# Patient Record
Sex: MALE | Race: WHITE | Employment: FULL TIME | URBAN - METROPOLITAN AREA
[De-identification: names, ages, dates, MRNs, and addresses within clinical notes are randomized per-mention and may not be internally consistent; named-entity substitution may affect disease eponyms.]

---

## 2023-06-23 PROBLEM — E78.5 HYPERLIPIDEMIA: Status: ACTIVE | Noted: 2023-06-23

## 2023-06-27 ENCOUNTER — OFFICE VISIT (OUTPATIENT)
Dept: PRIMARY CARE | Facility: CLINIC | Age: 38
End: 2023-06-27
Payer: COMMERCIAL

## 2023-06-27 VITALS
SYSTOLIC BLOOD PRESSURE: 138 MMHG | WEIGHT: 233.5 LBS | BODY MASS INDEX: 30.95 KG/M2 | OXYGEN SATURATION: 98 % | HEART RATE: 60 BPM | HEIGHT: 73 IN | DIASTOLIC BLOOD PRESSURE: 81 MMHG

## 2023-06-27 DIAGNOSIS — Z00.00 WELL ADULT HEALTH CHECK: Primary | ICD-10-CM

## 2023-06-27 DIAGNOSIS — R21 RASH: ICD-10-CM

## 2023-06-27 DIAGNOSIS — E78.5 HYPERLIPIDEMIA, UNSPECIFIED HYPERLIPIDEMIA TYPE: ICD-10-CM

## 2023-06-27 PROCEDURE — 99214 OFFICE O/P EST MOD 30 MIN: CPT | Performed by: FAMILY MEDICINE

## 2023-06-27 PROCEDURE — 99395 PREV VISIT EST AGE 18-39: CPT | Performed by: FAMILY MEDICINE

## 2023-06-27 RX ORDER — TRIAMCINOLONE ACETONIDE 1 MG/G
CREAM TOPICAL 2 TIMES DAILY
Qty: 15 G | Refills: 0 | Status: SHIPPED | OUTPATIENT
Start: 2023-06-27 | End: 2024-05-31 | Stop reason: WASHOUT

## 2023-06-27 ASSESSMENT — ENCOUNTER SYMPTOMS
BLOOD IN STOOL: 1
TROUBLE SWALLOWING: 0
COUGH: 0
SHORTNESS OF BREATH: 0
CHILLS: 0
WHEEZING: 0
WEAKNESS: 0
VOMITING: 0
LIGHT-HEADEDNESS: 0
FEVER: 0
DIZZINESS: 0
DYSURIA: 0
CONFUSION: 0
ABDOMINAL PAIN: 0
UNEXPECTED WEIGHT CHANGE: 0
DIARRHEA: 0
DIFFICULTY URINATING: 0
NUMBNESS: 0
NAUSEA: 0

## 2023-06-27 ASSESSMENT — PROMIS GLOBAL HEALTH SCALE
RATE_GENERAL_HEALTH: VERY GOOD
EMOTIONAL_PROBLEMS: RARELY
RATE_SOCIAL_SATISFACTION: VERY GOOD
RATE_AVERAGE_FATIGUE: MILD
CARRYOUT_PHYSICAL_ACTIVITIES: COMPLETELY
RATE_PHYSICAL_HEALTH: VERY GOOD
CARRYOUT_SOCIAL_ACTIVITIES: VERY GOOD
RATE_AVERAGE_PAIN: 2
RATE_QUALITY_OF_LIFE: VERY GOOD
RATE_MENTAL_HEALTH: VERY GOOD

## 2023-06-27 NOTE — ASSESSMENT & PLAN NOTE
Likely contact dermatitis (poison ivy) v. eczema +/- post-inflammatory hyperpigmentation. Try a topical steroid for up to 2 weeks. Dermatology if not resolved or returns.

## 2023-06-27 NOTE — PROGRESS NOTES
"Subjective   Patient ID: Mateo Leong is a 37 y.o. male who presents for Annual Exam (Pt presents for wellness exam, no concerns no rx's.BL).    HPI   Generally feeling well.    Has had blood in bowel movements. 10y ago had a colonoscopy for this. Reports internal hemorrhoids. Has always had little bit of bleeding off and on. No change since before the colonoscopy. Denies polyps. Does not recall a recommendation to repeat a colonoscopy. Denies FMHx colon cancer. FMHx father Crohn disease. He has occasional constipation.     Late March 2023 had rash just like poison ivy. Got better, but never really healed (persistent discoloration). Patch on ventral right wrist has recurred. Itchy, red. No pain. Current rash started \"inflating\" yesterday. Tried poison ivy cream      Review of Systems   Constitutional:  Negative for chills, fever and unexpected weight change.   HENT:  Negative for ear pain and trouble swallowing.    Respiratory:  Negative for cough, shortness of breath and wheezing.    Cardiovascular:  Negative for chest pain.   Gastrointestinal:  Positive for blood in stool. Negative for abdominal pain, diarrhea, nausea and vomiting.   Genitourinary:  Negative for difficulty urinating and dysuria.   Skin:  Negative for rash.   Neurological:  Negative for dizziness, syncope, weakness, light-headedness and numbness.   Psychiatric/Behavioral:  Negative for behavioral problems and confusion.        Objective   /81   Pulse 60   Ht 1.854 m (6' 1\")   Wt 106 kg (233 lb 8 oz)   SpO2 98%   BMI 30.81 kg/m²     Physical Exam  Vitals and nursing note reviewed.   Constitutional:       General: He is not in acute distress.     Appearance: Normal appearance. He is well-developed.   HENT:      Head: Normocephalic and atraumatic.      Nose: Nose normal.   Eyes:      General: No scleral icterus.     Extraocular Movements: Extraocular movements intact.      Conjunctiva/sclera: Conjunctivae normal.   Neck:      Thyroid: No " thyromegaly.      Vascular: No carotid bruit or JVD.   Cardiovascular:      Rate and Rhythm: Normal rate and regular rhythm.      Heart sounds: Normal heart sounds.   Pulmonary:      Effort: Pulmonary effort is normal. No respiratory distress.      Breath sounds: Normal breath sounds.   Abdominal:      General: Bowel sounds are normal. There is no distension.      Palpations: Abdomen is soft. There is no mass.      Tenderness: There is no abdominal tenderness. There is no guarding or rebound.   Musculoskeletal:      Cervical back: Normal range of motion. No tenderness.      Right lower leg: No edema.      Left lower leg: No edema.   Skin:     General: Skin is warm and dry.      Coloration: Skin is not jaundiced.      Findings: Rash (45 x 30 mm erythematous rash of papules of varying shapes and sizes on the ventral right wrist) present.   Neurological:      General: No focal deficit present.      Mental Status: He is alert and oriented to person, place, and time. Mental status is at baseline.   Psychiatric:         Mood and Affect: Mood normal.         Thought Content: Thought content normal.         Assessment/Plan   Problem List Items Addressed This Visit       Hyperlipidemia     TLC, Metamucil, recheck.         Relevant Orders    TSH with reflex to Free T4 if abnormal    Well adult health check - Primary     37yM doing fairly well.         Relevant Orders    Lipid Panel    Comprehensive Metabolic Panel    Rash     Likely contact dermatitis (poison ivy) v. eczema +/- post-inflammatory hyperpigmentation. Try a topical steroid for up to 2 weeks. Dermatology if not resolved or returns.         Relevant Medications    triamcinolone (Kenalog) 0.1 % cream    Other Relevant Orders    Referral to Dermatology

## 2023-06-27 NOTE — PATIENT INSTRUCTIONS
Could try Metamucil/psyllium husk once or twice a day with plenty of water, both to prevent constipation, and for your cardiovascular health.    You may try a topical steroid for up to 2 weeks. Please follow-up with a dermatologist if symptoms persist, change, worsen, or return.    Recommend a low-sugar, low-fat, low-cholesterol, high-fiber, heart-healthy diet and lifestyle, and regular cardio exercise and weight loss as appropriate. Please return for a follow-up appointment in 6 months, earlier if any question or concern.    [facility]  Dr. Moris Francisco NP-C  Pamela Newsome PA-C  Gricelda Pettit FNP-C  [address]  [place], [place] [id number] [phone]    Dr. Agata Juan PA-C  Isamar Willams NP-C  [address] [place], [place]  [phone]    [facility]  [address], [address]  [place], [place] [id number] [phone]    [address] [facility] [address] [address], [address]  [place] [address]  [phone]    [address] [place] [address], [address]  [place], [place] [id number] [phone]     For assistance with scheduling referrals or consultations, please call [phone]. For laboratory, radiology, and other tests, please call [phone] ([phone] for pediatrics). Please review prescription inserts and published information for possible adverse effects. Return after testing or consultation to review results and recommendations, if symptoms persist, change, worsen, or return, or if you have any question or concern. For non-emergencies, you may call the office. For emergencies, call [phone] or go to the nearest Emergency Department. Please schedule additional appointment(s) to address concern(s) not addressed today.    In general, results will not be released or discussed over the telephone. Results of tests done through [facility] are released via  [employer]:  [email or web address]    Until we complete our transition to the new system,  additional information can be found at [email or web address] or on your Android or iOS (iPhone, iPad) device using the [employer] umer available free of charge in your device's umer store.

## 2023-07-13 ENCOUNTER — LAB (OUTPATIENT)
Dept: LAB | Facility: LAB | Age: 38
End: 2023-07-13
Payer: COMMERCIAL

## 2023-07-13 DIAGNOSIS — E78.5 HYPERLIPIDEMIA, UNSPECIFIED HYPERLIPIDEMIA TYPE: ICD-10-CM

## 2023-07-13 DIAGNOSIS — Z00.00 WELL ADULT HEALTH CHECK: ICD-10-CM

## 2023-07-13 LAB
ALANINE AMINOTRANSFERASE (SGPT) (U/L) IN SER/PLAS: 19 U/L (ref 10–52)
ALBUMIN (G/DL) IN SER/PLAS: 4.4 G/DL (ref 3.4–5)
ALKALINE PHOSPHATASE (U/L) IN SER/PLAS: 39 U/L (ref 33–120)
ANION GAP IN SER/PLAS: 13 MMOL/L (ref 10–20)
ASPARTATE AMINOTRANSFERASE (SGOT) (U/L) IN SER/PLAS: 21 U/L (ref 9–39)
BILIRUBIN TOTAL (MG/DL) IN SER/PLAS: 1.5 MG/DL (ref 0–1.2)
CALCIUM (MG/DL) IN SER/PLAS: 9.2 MG/DL (ref 8.6–10.3)
CARBON DIOXIDE, TOTAL (MMOL/L) IN SER/PLAS: 27 MMOL/L (ref 21–32)
CHLORIDE (MMOL/L) IN SER/PLAS: 103 MMOL/L (ref 98–107)
CHOLESTEROL (MG/DL) IN SER/PLAS: 224 MG/DL (ref 0–199)
CHOLESTEROL IN HDL (MG/DL) IN SER/PLAS: 48.7 MG/DL
CHOLESTEROL/HDL RATIO: 4.6
CREATININE (MG/DL) IN SER/PLAS: 0.96 MG/DL (ref 0.5–1.3)
GFR MALE: >90 ML/MIN/1.73M2
GLUCOSE (MG/DL) IN SER/PLAS: 86 MG/DL (ref 74–99)
LDL: 159 MG/DL (ref 0–99)
POTASSIUM (MMOL/L) IN SER/PLAS: 4.1 MMOL/L (ref 3.5–5.3)
PROTEIN TOTAL: 7.1 G/DL (ref 6.4–8.2)
SODIUM (MMOL/L) IN SER/PLAS: 139 MMOL/L (ref 136–145)
THYROTROPIN (MIU/L) IN SER/PLAS BY DETECTION LIMIT <= 0.05 MIU/L: 2.42 MIU/L (ref 0.44–3.98)
TRIGLYCERIDE (MG/DL) IN SER/PLAS: 80 MG/DL (ref 0–149)
UREA NITROGEN (MG/DL) IN SER/PLAS: 11 MG/DL (ref 6–23)
VLDL: 16 MG/DL (ref 0–40)

## 2023-07-13 PROCEDURE — 80053 COMPREHEN METABOLIC PANEL: CPT

## 2023-07-13 PROCEDURE — 84443 ASSAY THYROID STIM HORMONE: CPT

## 2023-07-13 PROCEDURE — 80061 LIPID PANEL: CPT

## 2023-07-13 PROCEDURE — 36415 COLL VENOUS BLD VENIPUNCTURE: CPT

## 2024-05-31 ENCOUNTER — LAB (OUTPATIENT)
Dept: LAB | Facility: LAB | Age: 39
End: 2024-05-31
Payer: COMMERCIAL

## 2024-05-31 ENCOUNTER — OFFICE VISIT (OUTPATIENT)
Dept: PRIMARY CARE | Facility: CLINIC | Age: 39
End: 2024-05-31
Payer: COMMERCIAL

## 2024-05-31 VITALS
WEIGHT: 246.5 LBS | HEART RATE: 63 BPM | SYSTOLIC BLOOD PRESSURE: 128 MMHG | HEIGHT: 74 IN | OXYGEN SATURATION: 99 % | DIASTOLIC BLOOD PRESSURE: 83 MMHG | TEMPERATURE: 98 F | BODY MASS INDEX: 31.63 KG/M2

## 2024-05-31 DIAGNOSIS — E80.6 HYPERBILIRUBINEMIA: ICD-10-CM

## 2024-05-31 DIAGNOSIS — Z00.00 WELL ADULT HEALTH CHECK: Primary | ICD-10-CM

## 2024-05-31 DIAGNOSIS — Z31.69 INFERTILITY COUNSELING: ICD-10-CM

## 2024-05-31 DIAGNOSIS — E78.5 HYPERLIPIDEMIA, UNSPECIFIED HYPERLIPIDEMIA TYPE: ICD-10-CM

## 2024-05-31 LAB
ALBUMIN SERPL BCP-MCNC: 4.6 G/DL (ref 3.4–5)
ALP SERPL-CCNC: 44 U/L (ref 33–120)
ALT SERPL W P-5'-P-CCNC: 26 U/L (ref 10–52)
AST SERPL W P-5'-P-CCNC: 22 U/L (ref 9–39)
BILIRUB DIRECT SERPL-MCNC: 0.2 MG/DL (ref 0–0.3)
BILIRUB SERPL-MCNC: 1.2 MG/DL (ref 0–1.2)
CHOLEST SERPL-MCNC: 208 MG/DL (ref 0–199)
CHOLESTEROL/HDL RATIO: 4.5
HDLC SERPL-MCNC: 46.1 MG/DL
LDLC SERPL CALC-MCNC: 141 MG/DL
NON HDL CHOLESTEROL: 162 MG/DL (ref 0–149)
PROT SERPL-MCNC: 7.4 G/DL (ref 6.4–8.2)
TRIGL SERPL-MCNC: 104 MG/DL (ref 0–149)
VLDL: 21 MG/DL (ref 0–40)

## 2024-05-31 PROCEDURE — 36415 COLL VENOUS BLD VENIPUNCTURE: CPT

## 2024-05-31 PROCEDURE — 80061 LIPID PANEL: CPT

## 2024-05-31 PROCEDURE — 80076 HEPATIC FUNCTION PANEL: CPT

## 2024-05-31 PROCEDURE — 99395 PREV VISIT EST AGE 18-39: CPT | Performed by: FAMILY MEDICINE

## 2024-05-31 PROCEDURE — 99214 OFFICE O/P EST MOD 30 MIN: CPT | Performed by: FAMILY MEDICINE

## 2024-05-31 ASSESSMENT — PATIENT HEALTH QUESTIONNAIRE - PHQ9
1. LITTLE INTEREST OR PLEASURE IN DOING THINGS: NOT AT ALL
2. FEELING DOWN, DEPRESSED OR HOPELESS: NOT AT ALL
SUM OF ALL RESPONSES TO PHQ9 QUESTIONS 1 AND 2: 0

## 2024-05-31 ASSESSMENT — ENCOUNTER SYMPTOMS
LOSS OF SENSATION IN FEET: 0
CONSTIPATION: 1
OCCASIONAL FEELINGS OF UNSTEADINESS: 0
BLOOD IN STOOL: 1

## 2024-05-31 NOTE — PROGRESS NOTES
"Subjective   Patient ID: Mateo Leong is a 38 y.o. male who presents for Annual Exam (Pt presents for wellness, c/o spouse not being pregnant trying for 7 months, no other concerns, no meds. BL).  HPI Historian(s): Self    Generally feeling well.     Trying to get pregnant since November. Wife is 37.    Reports h/o colonoscopy >15y ago for bloody stool, reports determined to be due to internal hemorrhoids. No different than it's ever been. Hasn't had a significant episode of bleeding in a long time. Reports constipation due to \"narrow areas of the colon,\" is better now.    Review of Systems   Gastrointestinal:  Positive for blood in stool and constipation.   All other systems reviewed and are negative.  Broken bones    Objective   /83   Pulse 63   Temp 36.7 °C (98 °F)   Ht 1.88 m (6' 2\")   Wt 112 kg (246 lb 8 oz)   SpO2 99%   BMI 31.65 kg/m²     Physical Exam  Vitals and nursing note reviewed.   Constitutional:       General: He is not in acute distress.     Appearance: Normal appearance. He is well-developed.      Comments: No assistive device presently being used.   HENT:      Head: Normocephalic and atraumatic.      Nose: Nose normal.   Eyes:      General: No scleral icterus.     Extraocular Movements: Extraocular movements intact.      Conjunctiva/sclera: Conjunctivae normal.   Neck:      Thyroid: No thyromegaly.      Vascular: No carotid bruit or JVD.   Cardiovascular:      Rate and Rhythm: Normal rate and regular rhythm.      Heart sounds: Normal heart sounds.   Pulmonary:      Effort: Pulmonary effort is normal. No respiratory distress.      Breath sounds: Normal breath sounds.   Abdominal:      General: Bowel sounds are normal. There is no distension.      Palpations: Abdomen is soft. There is no mass.      Tenderness: There is no abdominal tenderness. There is no guarding or rebound.      Hernia: There is no hernia in the left inguinal area or right inguinal area.   Genitourinary:     Penis: " Normal and circumcised.       Testes: Normal.      Epididymis:      Right: Normal.      Left: Normal.      Morgan stage (genital): 5.   Musculoskeletal:      Cervical back: Normal range of motion. No tenderness.      Right lower leg: No edema.      Left lower leg: No edema.   Skin:     General: Skin is warm and dry.      Coloration: Skin is not jaundiced.   Neurological:      General: No focal deficit present.      Mental Status: He is alert and oriented to person, place, and time. Mental status is at baseline.   Psychiatric:         Mood and Affect: Mood normal.         Behavior: Behavior normal.         Thought Content: Thought content normal.         Assessment/Plan   Problem List Items Addressed This Visit       Hyperlipidemia    Current Assessment & Plan     Therapeutic lifestyle changes. Metamucil.         Relevant Orders    Lipid Panel    Hepatic Function Panel    Well adult health check - Primary    Current Assessment & Plan     38yM doing fairly well.         Infertility counseling    Current Assessment & Plan     >36, no pregnancy after >6 months trying. Advised that his wife needs to address with her physician. Will let us know if he needs an order for semen analysis. Advised follow-up appointment 2w after submitting for such a test.         Hyperbilirubinemia

## 2024-05-31 NOTE — ASSESSMENT & PLAN NOTE
>36, no pregnancy after >6 months trying. Advised that his wife needs to address with her physician. Will let us know if he needs an order for semen analysis. Advised follow-up appointment 2w after submitting for such a test.

## 2024-06-13 ENCOUNTER — APPOINTMENT (OUTPATIENT)
Dept: PRIMARY CARE | Facility: CLINIC | Age: 39
End: 2024-06-13
Payer: COMMERCIAL

## 2024-06-18 ENCOUNTER — TELEPHONE (OUTPATIENT)
Dept: PRIMARY CARE | Facility: CLINIC | Age: 39
End: 2024-06-18
Payer: COMMERCIAL

## 2024-06-18 NOTE — TELEPHONE ENCOUNTER
PT is asking if Meka could put in an order for semen analysis. (Per last office note.) please advise pt when added. [phone]

## 2024-06-19 DIAGNOSIS — Z31.69 INFERTILITY COUNSELING: ICD-10-CM

## 2024-06-27 DIAGNOSIS — N46.9 INFERTILITY MALE: Primary | ICD-10-CM

## 2024-07-17 ENCOUNTER — APPOINTMENT (OUTPATIENT)
Dept: ENDOCRINOLOGY | Facility: CLINIC | Age: 39
End: 2024-07-17
Payer: COMMERCIAL

## 2024-07-18 ENCOUNTER — ANCILLARY PROCEDURE (OUTPATIENT)
Dept: ENDOCRINOLOGY | Facility: CLINIC | Age: 39
End: 2024-07-18
Payer: COMMERCIAL

## 2024-07-18 DIAGNOSIS — N46.9 INFERTILITY MALE: ICD-10-CM

## 2024-07-18 LAB
ABSTINENCE (DAYS): 5 DAYS (ref 2–7)
AGGLUTINATION (SEMEN): NO
ANALYZED TIME:: ABNORMAL
ANDROLOGY LAB ID#: ABNORMAL
CLUMPS (SEMEN): NO
COLLECTED COMPLETELY: YES
COLLECTION LOCATION:: ABNORMAL
COLLECTION METHOD:: ABNORMAL
CONCENTRATION CN (POST-WASH): 0 MILL/ML
CONCENTRATION(SEMEN): 0 MILL/ML (ref 15–?)
DEBRIS (SEMEN): YES
LEUKOCYTE (SEMEN): ABNORMAL
NON PROG. MOTILITY (SEMEN): 0 %
NON PROG. MOTILITY CN (POST-WASH): 0 %
PROG. MOTILITY (SEMEN): 0 % (ref 32–?)
PROG. MOTILITY CN (POST-WASH): 0 %
RECEIVED TIME:: ABNORMAL
REI PARTNER DOB: ABNORMAL
REI PARTNER NAME: ABNORMAL
SEMEN APPEARANCE: NORMAL
SEMEN LIQUEFACTION: NORMAL
SEMEN VISCOSITY: NORMAL
TOTAL MOTILITY (SEMEN): 0 % (ref 40–?)
TOTAL MOTILITY CN (POST-WASH): 0 %
TOTAL NO OF MOTILE (SEMEN): 0 MILL
TOTAL NO OF MOTILE CN (POST-WASH): 0 MILL
TOTAL NO OF RND CELLS (SEMEN): 0.5 MILL (ref ?–5)
TOTAL NO OF SPERM (SEMEN): 0 MILL (ref 39–?)
TOTAL NO OF SPERM CN (POST-WASH): 0 MILL
VOLUME (SEMEN): 5.1 ML (ref 1.5–?)
VOLUME CN (POST-WASH): 0.2 ML

## 2024-07-18 PROCEDURE — 89321 SEMEN ANAL SPERM DETECTION: CPT | Performed by: OBSTETRICS & GYNECOLOGY

## 2024-08-29 ENCOUNTER — APPOINTMENT (OUTPATIENT)
Dept: UROLOGY | Facility: CLINIC | Age: 39
End: 2024-08-29
Payer: COMMERCIAL

## 2024-08-29 VITALS — HEIGHT: 74 IN | BODY MASS INDEX: 31.57 KG/M2 | WEIGHT: 246 LBS

## 2024-08-29 DIAGNOSIS — R39.9 URINARY SYMPTOM OR SIGN: ICD-10-CM

## 2024-08-29 DIAGNOSIS — N46.9 MALE INFERTILITY, UNSPECIFIED: ICD-10-CM

## 2024-08-29 LAB
POC APPEARANCE, URINE: CLEAR
POC BILIRUBIN, URINE: NEGATIVE
POC BLOOD, URINE: NEGATIVE
POC COLOR, URINE: YELLOW
POC GLUCOSE, URINE: NEGATIVE MG/DL
POC KETONES, URINE: ABNORMAL MG/DL
POC LEUKOCYTES, URINE: NEGATIVE
POC NITRITE,URINE: NEGATIVE
POC PH, URINE: 6.5 PH
POC PROTEIN, URINE: NEGATIVE MG/DL
POC SPECIFIC GRAVITY, URINE: 1.02
POC UROBILINOGEN, URINE: 0.2 EU/DL

## 2024-08-29 PROCEDURE — 99204 OFFICE O/P NEW MOD 45 MIN: CPT | Performed by: UROLOGY

## 2024-08-29 PROCEDURE — 81003 URINALYSIS AUTO W/O SCOPE: CPT | Performed by: UROLOGY

## 2024-08-29 PROCEDURE — 3008F BODY MASS INDEX DOCD: CPT | Performed by: UROLOGY

## 2024-08-29 ASSESSMENT — PAIN SCALES - GENERAL: PAINLEVEL: 0-NO PAIN

## 2024-08-30 NOTE — PROGRESS NOTES
"  Patient is a 38 y.o. male presenting with infertility    SUBJECTIVE:  HPI   He has azoospermia.  He and his wife have been unable to conceive.  His wife has had a negative evaluation.  He denies ED.  He denies hernia repair.         No results found for: \"URINECULTURE\"     Past Medical History:   Diagnosis Date    Other acne 12/02/2004    Other hemorrhoids     Internal hemorrhoids    Personal history of other diseases of the digestive system     History of bloody stools    Right-sided low back pain without sciatica 01/18/2016      Past Surgical History:   Procedure Laterality Date    OTHER SURGICAL HISTORY  06/21/2022    Colonoscopy      Family History   Problem Relation Name Age of Onset    Hypothyroidism Mother      Crohn's disease Father      Heart attack Paternal Grandfather  70 - 79    Cancer Paternal Grandfather        Social History     Socioeconomic History    Marital status:    Tobacco Use    Smoking status: Never    Smokeless tobacco: Current     Types: Chew   Vaping Use    Vaping status: Never Used   Substance and Sexual Activity    Alcohol use: Yes     Comment: occasionally    Drug use: Never        Review of Systems   Constitutional: denies any unintentional weight loss or change in strength.  Integumentary: denies any rashes or pruritus.  Eyes: denies any double vision or eye pain.  Ear/Nose/Mouth/Throat: denies any nosebleeds or gum bleeds.  Cardiovascular: denies any chest pain or syncope.  Respiratory: denies hemoptysis.  Gastrointestinal: denies nausea or vomiting.  Musculoskeletal: denies muscle cramping or weakness.  Neurologic: denies convulsions or seizures.  Hematologic/Lymphatic: denies bleeding tendencies.  Endocrine: denies heat/cold intolerance.  All other systems have been reviewed and are negative unless otherwise noted in the HPI.    OBJECTIVE:  There were no vitals taken for this visit.  Physical Exam   Constitutional: No obvious distress.  Eyes: Non-injected conjunctiva, " "sclera clear, EOMI.  Ears/Nose/Mouth/Throat: No obvious drainage per ears or nose.  Cardiovascular: Extremities are warm and well perfused. No edema, cyanosis or pallor.  Respiratory: No audible wheezing/stridor; respirations do not appear labored.  Gastrointestinal: Abdomen soft, not distended.  Musculoskeletal: Normal ROM of extremities.  Skin: No obvious rashes or open sores.  Neurologic: Alert and oriented, CN 2-12 grossly intact.  Psychiatric: Answers questions appropriately with normal affect.  Hematologic/Lymphatic/Immunologic: No obvious bruises or sites of spontaneous bleeding.  Genitourinary: No CVA tenderness, bladder not palpable.  No testicular masses.  Vas deferens palpable, SHALOM :2+ no nodules or tenderness.    Labs:  Lab Results   Component Value Date    WBC 6.0 06/21/2022    HGB 14.6 06/21/2022    HCT 42.2 06/21/2022     06/21/2022    CHOL 208 (H) 05/31/2024    TRIG 104 05/31/2024    HDL 46.1 05/31/2024    ALT 26 05/31/2024    AST 22 05/31/2024     07/13/2023    K 4.1 07/13/2023     07/13/2023    CREATININE 0.96 07/13/2023    BUN 11 07/13/2023    CO2 27 07/13/2023    TSH 2.42 07/13/2023     No results found for: \"KPSAT\", \"KPSAP\"  IMAGING:        PROCEDURES:    ASSESSMENT/PLAN:  Problem List Items Addressed This Visit    None  Visit Diagnoses       Urinary symptom or sign        Relevant Orders    POCT UA Automated manually resulted (Completed)    Male infertility, unspecified        Relevant Orders    Chromosome Analysis, Blood    Y-Deletion Analysis    Testosterone    Luteinizing hormone    Follicle stimulating hormone    POCT Semen Analysis Complete with Strict Morphology    Follow Up In Urology           He has azoospermia. We discussed his semen analysis and further evaluation. He will repeat a semen analysis. Karyotype and Y chromosome studies ordered.  Testosterone. LH and FSH ordered.  He will follow up with Dr. Rivera.          All questions were answered to the " patient’s satisfaction.  Patient agrees with the plan and wishes to proceed.  Follow-up will be scheduled appropriately.     Alex Ortega MD

## 2024-09-12 ENCOUNTER — ANCILLARY PROCEDURE (OUTPATIENT)
Dept: ENDOCRINOLOGY | Facility: CLINIC | Age: 39
End: 2024-09-12
Payer: COMMERCIAL

## 2024-09-12 DIAGNOSIS — N46.9 MALE INFERTILITY, UNSPECIFIED: ICD-10-CM

## 2024-09-12 LAB
ABSTINENCE (DAYS): 5 DAYS (ref 2–7)
AGGLUTINATION (SEMEN): NO
ANALYZED TIME:: ABNORMAL
ANDROLOGY LAB ID#: ABNORMAL
CLUMPS (SEMEN): NO
COLLECTED COMPLETELY: YES
COLLECTION LOCATION:: ABNORMAL
COLLECTION METHOD:: ABNORMAL
CONCENTRATION CN (POST-WASH): 0 MILL/ML
CONCENTRATION(SEMEN): 0 MILL/ML (ref 15–?)
DEBRIS (SEMEN): YES
LEUKOCYTE (SEMEN): ABNORMAL
NON PROG. MOTILITY (SEMEN): 0 %
NON PROG. MOTILITY CN (POST-WASH): 0 %
PROG. MOTILITY (SEMEN): 0 % (ref 32–?)
PROG. MOTILITY CN (POST-WASH): 0 %
RECEIVED TIME:: ABNORMAL
REI PARTNER DOB: ABNORMAL
REI PARTNER NAME: ABNORMAL
SEMEN APPEARANCE: NORMAL
SEMEN LIQUEFACTION: ABNORMAL
SEMEN VISCOSITY: NORMAL
TOTAL MOTILITY (SEMEN): 0 % (ref 40–?)
TOTAL MOTILITY CN (POST-WASH): 0 %
TOTAL NO OF MOTILE (SEMEN): 0 MILL
TOTAL NO OF MOTILE CN (POST-WASH): 0 MILL
TOTAL NO OF RND CELLS (SEMEN): 0.7 MILL (ref ?–5)
TOTAL NO OF SPERM (SEMEN): 0 MILL (ref 39–?)
TOTAL NO OF SPERM CN (POST-WASH): 0 MILL
VOLUME (SEMEN): 5 ML (ref 1.5–?)
VOLUME CN (POST-WASH): 0.2 ML

## 2024-09-12 PROCEDURE — 89321 SEMEN ANAL SPERM DETECTION: CPT | Performed by: STUDENT IN AN ORGANIZED HEALTH CARE EDUCATION/TRAINING PROGRAM

## 2024-09-18 ENCOUNTER — LAB (OUTPATIENT)
Dept: LAB | Facility: LAB | Age: 39
End: 2024-09-18
Payer: COMMERCIAL

## 2024-09-18 DIAGNOSIS — N46.9 MALE INFERTILITY, UNSPECIFIED: ICD-10-CM

## 2024-09-18 LAB
FSH SERPL-ACNC: 25.7 IU/L
LH SERPL-ACNC: 8.5 IU/L
TESTOST SERPL-MCNC: 242 NG/DL (ref 240–1000)

## 2024-09-18 PROCEDURE — 84403 ASSAY OF TOTAL TESTOSTERONE: CPT

## 2024-09-18 PROCEDURE — 83001 ASSAY OF GONADOTROPIN (FSH): CPT

## 2024-09-18 PROCEDURE — 83002 ASSAY OF GONADOTROPIN (LH): CPT

## 2024-09-18 PROCEDURE — 36415 COLL VENOUS BLD VENIPUNCTURE: CPT

## 2024-09-18 PROCEDURE — 81403 MOPATH PROCEDURE LEVEL 4: CPT

## 2024-09-20 LAB
ELECTRONICALLY SIGNED BY: NORMAL
Y CHROM DEL BLD/T: NORMAL

## 2024-09-25 ENCOUNTER — APPOINTMENT (OUTPATIENT)
Dept: INTEGRATIVE MEDICINE | Facility: CLINIC | Age: 39
End: 2024-09-25
Payer: COMMERCIAL

## 2024-10-01 ENCOUNTER — APPOINTMENT (OUTPATIENT)
Dept: UROLOGY | Facility: CLINIC | Age: 39
End: 2024-10-01
Payer: COMMERCIAL

## 2024-10-01 VITALS — TEMPERATURE: 96.6 F | HEIGHT: 74 IN | BODY MASS INDEX: 31.58 KG/M2

## 2024-10-01 DIAGNOSIS — R79.89 LOW TESTOSTERONE: Primary | ICD-10-CM

## 2024-10-01 DIAGNOSIS — N46.01 AZOOSPERMIA: ICD-10-CM

## 2024-10-01 DIAGNOSIS — Z31.69 INFERTILITY COUNSELING: ICD-10-CM

## 2024-10-01 PROCEDURE — 99204 OFFICE O/P NEW MOD 45 MIN: CPT | Performed by: UROLOGY

## 2024-10-01 PROCEDURE — 99406 BEHAV CHNG SMOKING 3-10 MIN: CPT | Performed by: UROLOGY

## 2024-10-01 ASSESSMENT — PAIN SCALES - GENERAL: PAINLEVEL: 0-NO PAIN

## 2024-10-01 NOTE — PROGRESS NOTES
11 months  No previous pregnancies for either partner  Wife had a negative workup from Gyn perspective        No toxins  No trauma/infecitons  No history of infertility    No coital lubricants  Not recently because of SA, fertile window trying 5/7 days  Chew tobacco  No illicit substance use  No anabolics

## 2024-10-01 NOTE — PROGRESS NOTES
HPI:  39 y.o. yo male  referred to me by  Dr Ortega for infertility    Dr Ortega's notes reviewed     Partner/wife name: Nohemy Leong  Partner/wife  : 1985  Attempting Pregnancy for :   11 months  Previous pregnancies for either partner: none    Wife is optimized from gyn perspective     Previous Semen analysis / Labs:   Component      Latest Ref Rng 2024   Volume (Semen)      1.5 mL 5.10  5.00    Concentration(Semen)      15 mill/mL 0.00 !  0 !    Total Motility (Semen)      40 % 0 !  0 !    Prog. Motility (Semen)      32 % 0 !  0 !    Non Prog. Motility (Semen)      % 0  0    Total No of Sperm (Semen)      39 mill 0.00 !  0 !    Total No of Motile (Semen)      mill 0.00  0    Total No of Rnd Cells (Semen)      5 mill 0.5  0.70    Leukocyte (Semen) NOT PERFORMED  NOT PERFORMED    VOLUME CN (Post-Wash)      mL 0.20  0.20    CONCENTRATION CN (Post-Wash)      mill/mL 0.00  0    TOTAL MOTILITY CN (Post-Wash)      % 0  0    PROG. MOTILITY CN (Post-Wash)      % 0  0    NON PROG. MOTILITY CN (Post-Wash)      % 0  0    TOTAL NO OF SPERM CN (Post-Wash)      mill 0.00  0    TOTAL NO OF MOTILE CN (Post-wash)      mill 0.00  0       Labs, all reviewed and interpreted:   Y Deletion 24: negative  Chromosome analysis pending   Lab Results   Component Value Date    TESTOSTERONE 242 2024     Lab Results   Component Value Date    LH 8.5 2024     Lab Results   Component Value Date    FSH 25.7 2024       PREVIOUS RISK FACTORS  History of testicular exposure to chemicals, radiation, or toxins: None  History of high fever, epididymitis, orchitis, prostatitis, sexually transmitted diseases, or trauma to the testicles: None  History of a varicocele, testicular torsion, cryptorchidism, postpubertile mumps or a family history of infertility: None  Coital Frequency: not recently because of SA, fertile window trying 5/7 days   Coital Lubricants: None  Problems with erections or  ejaculation: None  Smoker?  Chews tobacco   Previous Testosterone or anabolic steroid Use: none      PRIOR Assisted Reproductive Technology:  None      PMH:  Past Medical History:   Diagnosis Date    Other acne 12/02/2004    Other hemorrhoids     Internal hemorrhoids    Personal history of other diseases of the digestive system     History of bloody stools    Right-sided low back pain without sciatica 01/18/2016        PSH:  Past Surgical History:   Procedure Laterality Date    OTHER SURGICAL HISTORY  06/21/2022    Colonoscopy        Medications:  No current outpatient medications on file.    Allergy:  Allergies   Allergen Reactions    Amoxicillin Rash        Exam  Testicles descended bilaterally, nontender, no masses  Vasa palpable bilaterally  Penis circ'd, no lesions, no plaques      Assessment/Plan  #infertility  I counseled the patient in detail regarding infertility, specifically the male component. We reviewed different causes of male infertility as well as options to optimize male fertility, to different surgical interventions and assisted reproductive technologies.   We discussed micro-TESE in detail, including the risks, benefits, and alternatives to this procedure. We additionally discussed the costs of micro-TESE as well as lab fees. The patient understands that sperm retrieval is not guaranteed, and that the amount of sperm will likely be utitilized for assisited reproductive technology, specifically IVF. The patient also understands the risks fo the procedure itself, including bleeding, infection, inability to find sperm, damage to the testicle, need for testosterone replacement, hematomas, etc.    -will have wife be evaluated by Lori Solis from a fertility perspective   -will do a video visit in a couple weeks to decide on treatment if choosing micro TESE. Will have wife on visit   -will send codes to find out insurance coverage       #low testosterone  We discussed different modalities to treat  hypogonadism, including hcg, anastrozole, clomiphene, testosterone gels/creams, nasal testosterone, testosterone pellets, and testosterone injections. I also reviewed with him common risks following testosterone replacement, including cholesterol imbalance, polycythemia, cancer progression and infertility.   - will hold off on treatment at this time because of fertility preservation     #tobacco use  #Smoking Cessation   -I spent over 3 minutes of the visit counseling the patient in regards to cessation of smoking.       [id number]  Visit complexity inherent to evaluation and management (E&M) associated with medical care services that serve as the continuing focal point for all needed health care services and/or with medical care services that are part of ongoing care related to a patient's single, serious condition or a complex condition.    Scribe Attestation  By signing my name below, IChaya Scribe   attest that this documentation has been prepared under the direction and in the presence of Gian Rivera MD.

## 2024-10-01 NOTE — LETTER
2024     Alex Ortega MD  [id number] [facility], [address] [address]    Patient: Mateo Leong   YOB: 1985   Date of Visit: 10/1/2024       Dear Dr. Alex Ortega MD:    Thank you for referring Mateo Leong to me for evaluation. Below are my notes for this consultation.  If you have questions, please do not hesitate to call me. I look forward to following your patient along with you.       Sincerely,     Gian Rivera MD      CC: No Recipients  ______________________________________________________________________________________    HPI:  39 y.o. yo male  referred to me by  Dr Ortega for infertility    Dr Ortega's notes reviewed     Partner/wife name: Nohemy Leong  Partner/wife  : 1985  Attempting Pregnancy for :   11 months  Previous pregnancies for either partner: none    Wife is optimized from gyn perspective     Previous Semen analysis / Labs:   Component      Latest Ref Rng 2024   Volume (Semen)      1.5 mL 5.10  5.00    Concentration(Semen)      15 mill/mL 0.00 !  0 !    Total Motility (Semen)      40 % 0 !  0 !    Prog. Motility (Semen)      32 % 0 !  0 !    Non Prog. Motility (Semen)      % 0  0    Total No of Sperm (Semen)      39 mill 0.00 !  0 !    Total No of Motile (Semen)      mill 0.00  0    Total No of Rnd Cells (Semen)      5 mill 0.5  0.70    Leukocyte (Semen) NOT PERFORMED  NOT PERFORMED    VOLUME CN (Post-Wash)      mL 0.20  0.20    CONCENTRATION CN (Post-Wash)      mill/mL 0.00  0    TOTAL MOTILITY CN (Post-Wash)      % 0  0    PROG. MOTILITY CN (Post-Wash)      % 0  0    NON PROG. MOTILITY CN (Post-Wash)      % 0  0    TOTAL NO OF SPERM CN (Post-Wash)      mill 0.00  0    TOTAL NO OF MOTILE CN (Post-wash)      mill 0.00  0       Labs, all reviewed and interpreted:   Y Deletion 24: negative  Chromosome analysis pending   Lab Results   Component Value Date    TESTOSTERONE 242 2024     Lab  Results   Component Value Date    LH 8.5 09/18/2024     Lab Results   Component Value Date    FSH 25.7 09/18/2024       PREVIOUS RISK FACTORS  History of testicular exposure to chemicals, radiation, or toxins: None  History of high fever, epididymitis, orchitis, prostatitis, sexually transmitted diseases, or trauma to the testicles: None  History of a varicocele, testicular torsion, cryptorchidism, postpubertile mumps or a family history of infertility: None  Coital Frequency: not recently because of SA, fertile window trying 5/7 days   Coital Lubricants: None  Problems with erections or ejaculation: None  Smoker?  Chews tobacco   Previous Testosterone or anabolic steroid Use: none      PRIOR Assisted Reproductive Technology:  None      PMH:  Past Medical History:   Diagnosis Date   • Other acne 12/02/2004   • Other hemorrhoids     Internal hemorrhoids   • Personal history of other diseases of the digestive system     History of bloody stools   • Right-sided low back pain without sciatica 01/18/2016        PSH:  Past Surgical History:   Procedure Laterality Date   • OTHER SURGICAL HISTORY  06/21/2022    Colonoscopy        Medications:  No current outpatient medications on file.    Allergy:  Allergies   Allergen Reactions   • Amoxicillin Rash        Exam  Testicles descended bilaterally, nontender, no masses  Vasa palpable bilaterally  Penis circ'd, no lesions, no plaques      Assessment/Plan  #infertility  I counseled the patient in detail regarding infertility, specifically the male component. We reviewed different causes of male infertility as well as options to optimize male fertility, to different surgical interventions and assisted reproductive technologies.   We discussed micro-TESE in detail, including the risks, benefits, and alternatives to this procedure. We additionally discussed the costs of micro-TESE as well as lab fees. The patient understands that sperm retrieval is not guaranteed, and that the  amount of sperm will likely be utitilized for assisited reproductive technology, specifically IVF. The patient also understands the risks fo the procedure itself, including bleeding, infection, inability to find sperm, damage to the testicle, need for testosterone replacement, hematomas, etc.    -will have wife be evaluated by Lori Solis from a fertility perspective   -will do a video visit in a couple weeks to decide on treatment if choosing micro TESE. Will have wife on visit   -will send codes to find out insurance coverage       #low testosterone  We discussed different modalities to treat hypogonadism, including hcg, anastrozole, clomiphene, testosterone gels/creams, nasal testosterone, testosterone pellets, and testosterone injections. I also reviewed with him common risks following testosterone replacement, including cholesterol imbalance, polycythemia, cancer progression and infertility.   - will hold off on treatment at this time because of fertility preservation     #tobacco use  #Smoking Cessation   -I spent over 3 minutes of the visit counseling the patient in regards to cessation of smoking.       G 3364  Visit complexity inherent to evaluation and management (E&M) associated with medical care services that serve as the continuing focal point for all needed health care services and/or with medical care services that are part of ongoing care related to a patient's single, serious condition or a complex condition.    Scribe Attestation  By signing my name below, IChaya Scrkinga   attest that this documentation has been prepared under the direction and in the presence of Gian Rivera MD.

## 2024-10-01 NOTE — LETTER
2024     Lori Solis APRN-CNP   [facility]  [facility] [address]    Patient: Mateo Leong   YOB: 1985   Date of Visit: 10/1/2024       Dear Dr. Lori Solis, APRN-CNP:     Below are my notes for this consultation.  If you have questions, please do not hesitate to call me. I look forward to following your patient along with you.       Sincerely,     Gian Rivera MD      CC: No Recipients  ______________________________________________________________________________________    HPI:  39 y.o. yo male  referred to me by  Dr Ortega for infertility    Dr Ortega's notes reviewed     Partner/wife name: Nohemy Leong  Partner/wife  : 1985  Attempting Pregnancy for :   11 months  Previous pregnancies for either partner: none    Wife is optimized from gyn perspective     Previous Semen analysis / Labs:   Component      Latest Ref Rng 2024   Volume (Semen)      1.5 mL 5.10  5.00    Concentration(Semen)      15 mill/mL 0.00 !  0 !    Total Motility (Semen)      40 % 0 !  0 !    Prog. Motility (Semen)      32 % 0 !  0 !    Non Prog. Motility (Semen)      % 0  0    Total No of Sperm (Semen)      39 mill 0.00 !  0 !    Total No of Motile (Semen)      mill 0.00  0    Total No of Rnd Cells (Semen)      5 mill 0.5  0.70    Leukocyte (Semen) NOT PERFORMED  NOT PERFORMED    VOLUME CN (Post-Wash)      mL 0.20  0.20    CONCENTRATION CN (Post-Wash)      mill/mL 0.00  0    TOTAL MOTILITY CN (Post-Wash)      % 0  0    PROG. MOTILITY CN (Post-Wash)      % 0  0    NON PROG. MOTILITY CN (Post-Wash)      % 0  0    TOTAL NO OF SPERM CN (Post-Wash)      mill 0.00  0    TOTAL NO OF MOTILE CN (Post-wash)      mill 0.00  0       Labs, all reviewed and interpreted:   Y Deletion 24: negative  Chromosome analysis pending   Lab Results   Component Value Date    TESTOSTERONE 242 2024     Lab Results   Component Value  Date    LH 8.5 09/18/2024     Lab Results   Component Value Date    FSH 25.7 09/18/2024       PREVIOUS RISK FACTORS  History of testicular exposure to chemicals, radiation, or toxins: None  History of high fever, epididymitis, orchitis, prostatitis, sexually transmitted diseases, or trauma to the testicles: None  History of a varicocele, testicular torsion, cryptorchidism, postpubertile mumps or a family history of infertility: None  Coital Frequency: not recently because of SA, fertile window trying 5/7 days   Coital Lubricants: None  Problems with erections or ejaculation: None  Smoker?  Chews tobacco   Previous Testosterone or anabolic steroid Use: none      PRIOR Assisted Reproductive Technology:  None      PMH:  Past Medical History:   Diagnosis Date   • Other acne 12/02/2004   • Other hemorrhoids     Internal hemorrhoids   • Personal history of other diseases of the digestive system     History of bloody stools   • Right-sided low back pain without sciatica 01/18/2016        PSH:  Past Surgical History:   Procedure Laterality Date   • OTHER SURGICAL HISTORY  06/21/2022    Colonoscopy        Medications:  No current outpatient medications on file.    Allergy:  Allergies   Allergen Reactions   • Amoxicillin Rash        Exam  Testicles descended bilaterally, nontender, no masses  Vasa palpable bilaterally  Penis circ'd, no lesions, no plaques      Assessment/Plan  #infertility  I counseled the patient in detail regarding infertility, specifically the male component. We reviewed different causes of male infertility as well as options to optimize male fertility, to different surgical interventions and assisted reproductive technologies.   We discussed micro-TESE in detail, including the risks, benefits, and alternatives to this procedure. We additionally discussed the costs of micro-TESE as well as lab fees. The patient understands that sperm retrieval is not guaranteed, and that the amount of sperm will likely be  utitilized for assisited reproductive technology, specifically IVF. The patient also understands the risks fo the procedure itself, including bleeding, infection, inability to find sperm, damage to the testicle, need for testosterone replacement, hematomas, etc.    -will have wife be evaluated by Lori Solis from a fertility perspective   -will do a video visit in a couple weeks to decide on treatment if choosing micro TESE. Will have wife on visit   -will send codes to find out insurance coverage       #low testosterone  We discussed different modalities to treat hypogonadism, including hcg, anastrozole, clomiphene, testosterone gels/creams, nasal testosterone, testosterone pellets, and testosterone injections. I also reviewed with him common risks following testosterone replacement, including cholesterol imbalance, polycythemia, cancer progression and infertility.   - will hold off on treatment at this time because of fertility preservation     #tobacco use  #Smoking Cessation   -I spent over 3 minutes of the visit counseling the patient in regards to cessation of smoking.       G 8138  Visit complexity inherent to evaluation and management (E&M) associated with medical care services that serve as the continuing focal point for all needed health care services and/or with medical care services that are part of ongoing care related to a patient's single, serious condition or a complex condition.    Scribe Attestation  By signing my name below, IChaya Scrkinga   attest that this documentation has been prepared under the direction and in the presence of Gian Rivera MD.

## 2024-10-02 RX ORDER — CELECOXIB 400 MG/1
400 CAPSULE ORAL ONCE
OUTPATIENT
Start: 2024-10-02 | End: 2024-10-02

## 2024-10-02 RX ORDER — GABAPENTIN 300 MG/1
600 CAPSULE ORAL ONCE
OUTPATIENT
Start: 2024-10-02 | End: 2024-10-02

## 2024-10-02 RX ORDER — CEFAZOLIN SODIUM 2 G/100ML
2 INJECTION, SOLUTION INTRAVENOUS ONCE
OUTPATIENT
Start: 2024-10-02 | End: 2024-10-02

## 2024-10-02 RX ORDER — SODIUM CHLORIDE, SODIUM LACTATE, POTASSIUM CHLORIDE, CALCIUM CHLORIDE 600; 310; 30; 20 MG/100ML; MG/100ML; MG/100ML; MG/100ML
20 INJECTION, SOLUTION INTRAVENOUS CONTINUOUS
OUTPATIENT
Start: 2024-10-02

## 2024-10-02 RX ORDER — ACETAMINOPHEN 325 MG/1
975 TABLET ORAL ONCE
OUTPATIENT
Start: 2024-10-02 | End: 2024-10-02

## 2024-10-06 NOTE — PROGRESS NOTES
Virtual or Telephone Consent    An interactive audio and video telecommunication system which permits real time communications between the patient (at the originating site) and provider (at the distant site) was utilized to provide this telehealth service.   Verbal consent was requested and obtained from Mateo Leong on this date, 10/08/24 for a telehealth visit.     HPI:  39 y.o. yo male  referred to me by  Dr Ortega for infertility    Last Visit, 10/1/24:  -will have wife be evaluated by Lori Solis from a fertility perspective   -will do a video visit in a couple weeks to decide on treatment if choosing micro TESE. Will have wife on visit   -will send codes to find out insurance coverage   #low testosterone  - will hold off on treatment at this time because of fertility preservation   -Smoking Cessation    Today's Visit     Dr Ortega's notes reviewed     Partner/wife name: Nohemy Leong  Partner/wife  : 1985  Attempting Pregnancy for :   11 months  Previous pregnancies for either partner: none    Wife is optimized from gyn perspective     Previous Semen analysis / Labs:   Component      Latest Ref Rng 2024   Volume (Semen)      1.5 mL 5.10  5.00    Concentration(Semen)      15 mill/mL 0.00 !  0 !    Total Motility (Semen)      40 % 0 !  0 !    Prog. Motility (Semen)      32 % 0 !  0 !    Non Prog. Motility (Semen)      % 0  0    Total No of Sperm (Semen)      39 mill 0.00 !  0 !    Total No of Motile (Semen)      mill 0.00  0    Total No of Rnd Cells (Semen)      5 mill 0.5  0.70    Leukocyte (Semen) NOT PERFORMED  NOT PERFORMED    VOLUME CN (Post-Wash)      mL 0.20  0.20    CONCENTRATION CN (Post-Wash)      mill/mL 0.00  0    TOTAL MOTILITY CN (Post-Wash)      % 0  0    PROG. MOTILITY CN (Post-Wash)      % 0  0    NON PROG. MOTILITY CN (Post-Wash)      % 0  0    TOTAL NO OF SPERM CN (Post-Wash)      mill 0.00  0    TOTAL NO OF MOTILE CN (Post-wash)      mill 0.00  0       Labs, all  reviewed and interpreted:   Y Deletion 9/18/24: negative  Chromosome analysis pending   Lab Results   Component Value Date    TESTOSTERONE 242 09/18/2024     Lab Results   Component Value Date    LH 8.5 09/18/2024     Lab Results   Component Value Date    FSH 25.7 09/18/2024       PREVIOUS RISK FACTORS  History of testicular exposure to chemicals, radiation, or toxins: None  History of high fever, epididymitis, orchitis, prostatitis, sexually transmitted diseases, or trauma to the testicles: None  History of a varicocele, testicular torsion, cryptorchidism, postpubertile mumps or a family history of infertility: None  Coital Frequency: not recently because of SA, fertile window trying 5/7 days   Coital Lubricants: None  Problems with erections or ejaculation: None  Smoker?  Chews tobacco   Previous Testosterone or anabolic steroid Use: none      PRIOR Assisted Reproductive Technology:  None      PMH:  Past Medical History:   Diagnosis Date    Other acne 12/02/2004    Other hemorrhoids     Internal hemorrhoids    Personal history of other diseases of the digestive system     History of bloody stools    Right-sided low back pain without sciatica 01/18/2016        PSH:  Past Surgical History:   Procedure Laterality Date    OTHER SURGICAL HISTORY  06/21/2022    Colonoscopy        Medications:  No current outpatient medications on file.    Allergy:  Allergies   Allergen Reactions    Amoxicillin Rash        Exam  CONSTITUTIONAL:        No acute distress    HEAD:        Normocephalic and atraumatic    CHEST / RESPIRATORY      no excess work of breathing, no respiratory distress,    ABDOMEN / GASTROINTESTINAL:        Abdomen nondistended         Assessment/Plan  #infertility  I counseled the patient in detail regarding infertility, specifically the male component. We reviewed different causes of male infertility as well as options to optimize male fertility, to different surgical interventions and assisted reproductive  technologies.   We discussed micro-TESE in detail with patient and wife, including the risks, benefits, and alternatives to this procedure. We additionally discussed the costs of micro-TESE as well as lab fees. The patient understands that sperm retrieval is not guaranteed, and that the amount of sperm will likely be utitilized for assisited reproductive technology, specifically IVF. The patient also understands the risks fo the procedure itself, including bleeding, infection, inability to find sperm, damage to the testicle, need for testosterone replacement, hematomas, etc.               - will schedule micro-TESE , will call us to schedule (insurance coverage)      #low testosterone  We discussed different modalities to treat hypogonadism, including hcg, anastrozole, clomiphene, testosterone gels/creams, nasal testosterone, testosterone pellets, and testosterone injections. I also reviewed with him common risks following testosterone replacement, including cholesterol imbalance, polycythemia, cancer progression and infertility.   - will hold off on treatment at this time because of fertility preservation         G 2211  Visit complexity inherent to evaluation and management (E&M) associated with medical care services that serve as the continuing focal point for all needed health care services and/or with medical care services that are part of ongoing care related to a patient's single, serious condition or a complex condition.    Scribe Attestation  By signing my name below, IChaya Scribe   attest that this documentation has been prepared under the direction and in the presence of Gian Rivera MD.

## 2024-10-08 ENCOUNTER — APPOINTMENT (OUTPATIENT)
Dept: UROLOGY | Facility: CLINIC | Age: 39
End: 2024-10-08
Payer: COMMERCIAL

## 2024-10-08 DIAGNOSIS — N46.01 AZOOSPERMIA: ICD-10-CM

## 2024-10-08 DIAGNOSIS — R79.89 LOW TESTOSTERONE: Primary | ICD-10-CM

## 2024-10-08 PROCEDURE — 99214 OFFICE O/P EST MOD 30 MIN: CPT | Performed by: UROLOGY

## 2024-10-22 ENCOUNTER — ANCILLARY PROCEDURE (OUTPATIENT)
Dept: ENDOCRINOLOGY | Facility: CLINIC | Age: 39
End: 2024-10-22
Payer: COMMERCIAL

## 2024-10-22 ENCOUNTER — CONSULT (OUTPATIENT)
Dept: ENDOCRINOLOGY | Facility: CLINIC | Age: 39
End: 2024-10-22
Payer: COMMERCIAL

## 2024-10-22 VITALS
HEIGHT: 74 IN | SYSTOLIC BLOOD PRESSURE: 138 MMHG | WEIGHT: 251 LBS | HEART RATE: 71 BPM | BODY MASS INDEX: 32.21 KG/M2 | DIASTOLIC BLOOD PRESSURE: 91 MMHG

## 2024-10-22 DIAGNOSIS — Z11.3 ENCOUNTER FOR SCREENING EXAMINATION FOR SEXUALLY TRANSMITTED DISEASE: ICD-10-CM

## 2024-10-22 DIAGNOSIS — Z11.3 ENCOUNTER FOR SCREENING EXAMINATION FOR SEXUALLY TRANSMITTED DISEASE: Primary | ICD-10-CM

## 2024-10-22 LAB
HBV SURFACE AG SERPL QL IA: NONREACTIVE
HCV AB SER QL: NONREACTIVE
HIV 1+2 AB+HIV1 P24 AG SERPL QL IA: NONREACTIVE
TREPONEMA PALLIDUM IGG+IGM AB [PRESENCE] IN SERUM OR PLASMA BY IMMUNOASSAY: NONREACTIVE

## 2024-10-22 PROCEDURE — 99202 OFFICE O/P NEW SF 15 MIN: CPT | Performed by: NURSE PRACTITIONER

## 2024-10-22 PROCEDURE — 87491 CHLMYD TRACH DNA AMP PROBE: CPT

## 2024-10-22 PROCEDURE — 87389 HIV-1 AG W/HIV-1&-2 AB AG IA: CPT

## 2024-10-22 PROCEDURE — 87340 HEPATITIS B SURFACE AG IA: CPT

## 2024-10-22 PROCEDURE — 86780 TREPONEMA PALLIDUM: CPT

## 2024-10-22 PROCEDURE — 87591 N.GONORRHOEAE DNA AMP PROB: CPT

## 2024-10-22 PROCEDURE — 86803 HEPATITIS C AB TEST: CPT

## 2024-10-22 PROCEDURE — 99212 OFFICE O/P EST SF 10 MIN: CPT | Performed by: NURSE PRACTITIONER

## 2024-10-22 ASSESSMENT — PATIENT HEALTH QUESTIONNAIRE - PHQ9
1. LITTLE INTEREST OR PLEASURE IN DOING THINGS: NOT AT ALL
SUM OF ALL RESPONSES TO PHQ9 QUESTIONS 1 AND 2: 0
2. FEELING DOWN, DEPRESSED OR HOPELESS: NOT AT ALL

## 2024-10-22 ASSESSMENT — PAIN SCALES - GENERAL: PAINLEVEL_OUTOF10: 0-NO PAIN

## 2024-10-22 ASSESSMENT — COLUMBIA-SUICIDE SEVERITY RATING SCALE - C-SSRS
2. HAVE YOU ACTUALLY HAD ANY THOUGHTS OF KILLING YOURSELF?: NO
1. IN THE PAST MONTH, HAVE YOU WISHED YOU WERE DEAD OR WISHED YOU COULD GO TO SLEEP AND NOT WAKE UP?: NO
6. HAVE YOU EVER DONE ANYTHING, STARTED TO DO ANYTHING, OR PREPARED TO DO ANYTHING TO END YOUR LIFE?: NO

## 2024-10-23 ENCOUNTER — APPOINTMENT (OUTPATIENT)
Dept: UROLOGY | Facility: CLINIC | Age: 39
End: 2024-10-23
Payer: COMMERCIAL

## 2024-10-23 LAB
C TRACH RRNA SPEC QL NAA+PROBE: NEGATIVE
N GONORRHOEA DNA SPEC QL PROBE+SIG AMP: NEGATIVE

## 2024-11-11 ENCOUNTER — TELEPHONE (OUTPATIENT)
Dept: UROLOGY | Facility: HOSPITAL | Age: 39
End: 2024-11-11
Payer: COMMERCIAL

## 2024-11-11 NOTE — TELEPHONE ENCOUNTER
Call placed to patient to notify of sooner OR availability. No answer, left detailed VM of available OR date.

## 2024-11-14 DIAGNOSIS — N46.01 AZOOSPERMIA: ICD-10-CM

## 2024-11-15 LAB
BAND RESOLUTION: 550 BANDS
CHROM ANALY OVERALL INTERP-IMP: NORMAL
CHROMOSOME ANALYSIS CELLS ANALYZED: 6 CELLS
CHROMOSOME ANALYSIS CELLS IMAGED: 5 CELLS
CHROMOSOME ANALYSIS HYPERMODAL CELL COUNT: 0 CELLS
CHROMOSOME ANALYSIS HYPOMODAL CELL COUNT: 0 CELLS
CHROMOSOME ANALYSIS MODAL CHROMOSOME NO: 46 CHROMOSOMES
CHROMOSOME ANALYSIS STAINING METHOD: NORMAL
ELECTRONICALLY SIGNED BY CYTOGENETICS: NORMAL
KARYOTYP BLD/T: 3 CELLS
TOTAL CELLS COUNTED BLD: 30 CELLS

## 2024-12-02 ENCOUNTER — CLINICAL SUPPORT (OUTPATIENT)
Dept: PREADMISSION TESTING | Facility: HOSPITAL | Age: 39
End: 2024-12-02
Payer: COMMERCIAL

## 2024-12-02 DIAGNOSIS — N46.01 AZOOSPERMIA: ICD-10-CM

## 2024-12-02 RX ORDER — BISMUTH SUBSALICYLATE 262 MG
1 TABLET,CHEWABLE ORAL DAILY
COMMUNITY

## 2024-12-05 ENCOUNTER — APPOINTMENT (OUTPATIENT)
Dept: PREADMISSION TESTING | Facility: HOSPITAL | Age: 39
End: 2024-12-05
Payer: COMMERCIAL

## 2024-12-05 DIAGNOSIS — Z13.71 SCREENING FOR GENETIC DISEASE CARRIER STATUS: Primary | ICD-10-CM

## 2024-12-12 ENCOUNTER — PRE-ADMISSION TESTING (OUTPATIENT)
Dept: PREADMISSION TESTING | Facility: HOSPITAL | Age: 39
End: 2024-12-12
Payer: COMMERCIAL

## 2024-12-12 ENCOUNTER — APPOINTMENT (OUTPATIENT)
Dept: ENDOCRINOLOGY | Facility: CLINIC | Age: 39
End: 2024-12-12
Payer: COMMERCIAL

## 2024-12-12 ENCOUNTER — LAB (OUTPATIENT)
Dept: LAB | Facility: LAB | Age: 39
End: 2024-12-12
Payer: COMMERCIAL

## 2024-12-12 VITALS
BODY MASS INDEX: 31.97 KG/M2 | TEMPERATURE: 98.6 F | RESPIRATION RATE: 16 BRPM | HEART RATE: 86 BPM | OXYGEN SATURATION: 97 % | DIASTOLIC BLOOD PRESSURE: 83 MMHG | SYSTOLIC BLOOD PRESSURE: 136 MMHG | WEIGHT: 249.12 LBS | HEIGHT: 74 IN

## 2024-12-12 DIAGNOSIS — Z01.818 PREOP TESTING: Primary | ICD-10-CM

## 2024-12-12 DIAGNOSIS — Z01.818 PREOP TESTING: ICD-10-CM

## 2024-12-12 LAB
ANION GAP SERPL CALC-SCNC: 11 MMOL/L (ref 10–20)
APPEARANCE UR: CLEAR
BASOPHILS # BLD AUTO: 0.07 X10*3/UL (ref 0–0.1)
BASOPHILS NFR BLD AUTO: 1.4 %
BILIRUB UR STRIP.AUTO-MCNC: NEGATIVE MG/DL
BUN SERPL-MCNC: 13 MG/DL (ref 6–23)
CALCIUM SERPL-MCNC: 9 MG/DL (ref 8.6–10.3)
CHLORIDE SERPL-SCNC: 104 MMOL/L (ref 98–107)
CO2 SERPL-SCNC: 27 MMOL/L (ref 21–32)
COLOR UR: NORMAL
CREAT SERPL-MCNC: 0.83 MG/DL (ref 0.5–1.3)
EGFRCR SERPLBLD CKD-EPI 2021: >90 ML/MIN/1.73M*2
EOSINOPHIL # BLD AUTO: 0.09 X10*3/UL (ref 0–0.7)
EOSINOPHIL NFR BLD AUTO: 1.7 %
ERYTHROCYTE [DISTWIDTH] IN BLOOD BY AUTOMATED COUNT: 13.3 % (ref 11.5–14.5)
GLUCOSE SERPL-MCNC: 100 MG/DL (ref 74–99)
GLUCOSE UR STRIP.AUTO-MCNC: NORMAL MG/DL
HCT VFR BLD AUTO: 40.3 % (ref 41–52)
HGB BLD-MCNC: 14 G/DL (ref 13.5–17.5)
IMM GRANULOCYTES # BLD AUTO: 0.02 X10*3/UL (ref 0–0.7)
IMM GRANULOCYTES NFR BLD AUTO: 0.4 % (ref 0–0.9)
KETONES UR STRIP.AUTO-MCNC: NEGATIVE MG/DL
LEUKOCYTE ESTERASE UR QL STRIP.AUTO: NEGATIVE
LYMPHOCYTES # BLD AUTO: 1.24 X10*3/UL (ref 1.2–4.8)
LYMPHOCYTES NFR BLD AUTO: 24 %
MCH RBC QN AUTO: 31.8 PG (ref 26–34)
MCHC RBC AUTO-ENTMCNC: 34.7 G/DL (ref 32–36)
MCV RBC AUTO: 92 FL (ref 80–100)
MONOCYTES # BLD AUTO: 0.49 X10*3/UL (ref 0.1–1)
MONOCYTES NFR BLD AUTO: 9.5 %
NEUTROPHILS # BLD AUTO: 3.25 X10*3/UL (ref 1.2–7.7)
NEUTROPHILS NFR BLD AUTO: 63 %
NITRITE UR QL STRIP.AUTO: NEGATIVE
NRBC BLD-RTO: 0 /100 WBCS (ref 0–0)
PH UR STRIP.AUTO: 7 [PH]
PLATELET # BLD AUTO: 295 X10*3/UL (ref 150–450)
POTASSIUM SERPL-SCNC: 4.2 MMOL/L (ref 3.5–5.3)
PROT UR STRIP.AUTO-MCNC: NEGATIVE MG/DL
RBC # BLD AUTO: 4.4 X10*6/UL (ref 4.5–5.9)
RBC # UR STRIP.AUTO: NEGATIVE /UL
SODIUM SERPL-SCNC: 138 MMOL/L (ref 136–145)
SP GR UR STRIP.AUTO: 1.01
UROBILINOGEN UR STRIP.AUTO-MCNC: NORMAL MG/DL
WBC # BLD AUTO: 5.2 X10*3/UL (ref 4.4–11.3)

## 2024-12-12 PROCEDURE — 85025 COMPLETE CBC W/AUTO DIFF WBC: CPT

## 2024-12-12 PROCEDURE — 99204 OFFICE O/P NEW MOD 45 MIN: CPT | Performed by: PHYSICIAN ASSISTANT

## 2024-12-12 PROCEDURE — 36415 COLL VENOUS BLD VENIPUNCTURE: CPT

## 2024-12-12 PROCEDURE — 81003 URINALYSIS AUTO W/O SCOPE: CPT

## 2024-12-12 PROCEDURE — 80048 BASIC METABOLIC PNL TOTAL CA: CPT

## 2024-12-12 ASSESSMENT — ENCOUNTER SYMPTOMS
CHILLS: 0
TROUBLE SWALLOWING: 0
WEAKNESS: 0
NUMBNESS: 0
BLOOD IN STOOL: 0
HEMOPTYSIS: 0
EYE DISCHARGE: 0
VISUAL CHANGE: 0
DOUBLE VISION: 0
NECK STIFFNESS: 0
EXCESSIVE BLEEDING: 0
UNEXPECTED WEIGHT CHANGE: 0
VOMITING: 0
DIFFICULTY URINATING: 0
WOUND: 0
PALPITATIONS: 0
SKIN CHANGES: 0
SINUS CONGESTION: 0
ABDOMINAL DISTENTION: 0
DIARRHEA: 0
FEVER: 0
CONFUSION: 0
RHINORRHEA: 0
LIMITED RANGE OF MOTION: 0
DYSURIA: 0
LIGHT-HEADEDNESS: 0
SHORTNESS OF BREATH: 0
COUGH: 0
ARTHRALGIAS: 1
NECK PAIN: 0
CONSTIPATION: 1
MYALGIAS: 0
BRUISES/BLEEDS EASILY: 0
WHEEZING: 0
DYSPNEA AT REST: 0
EYE PAIN: 0
ABDOMINAL PAIN: 0
DYSPNEA WITH EXERTION: 0
NAUSEA: 0

## 2024-12-12 NOTE — PREPROCEDURE INSTRUCTIONS
Medication List            Accurate as of December 12, 2024  2:45 PM. Always use your most recent med list.                NON FORMULARY  Additional Medication Adjustments for Surgery: Take last dose 7 days before surgery     prenatal vitamin calcium-iron-folic 27 mg iron- 1 mg tablet  Additional Medication Adjustments for Surgery: Take last dose 7 days before surgery                            **Concerning above medication instructions, if medication is normally taken at night, continue normal schedule.**  **DO NOT TAKE NIGHT PRIOR AND MORNING OF SURGERY**    CONTACT SURGEON'S OFFICE IF YOU DEVELOP:  * Fever = 100.4 F   * New respiratory symptoms (e.g. cough, shortness of breath, respiratory distress, sore throat)  * Recent loss of taste or smell  *Flu like symptoms such as headache, fatigue or gastrointestinal symptoms  * You develop any open sores, shingles, burning or painful urination   AND/OR:  * You no longer wish to have the surgery.  * Any other personal circumstances change that may lead to the need to cancel or defer this surgery.  *You were admitted to any hospital within one week of your planned procedure.    SMOKING:  *Quitting smoking can make a huge difference to your health and recovery from surgery.    *If you need help with quitting, call 1-027-QUIT-NOW.    THE DAY OF SURGERY:  *Do not eat any food after midnight the night before surgery.   *You must drink 13.5 ounces of clear liquids (i.e. water, black coffee (no milk or cream), tea, apple juice or electrolyte drinks (gatorade)) 2 hours before your arrival time.  *You may chew gum until 2 hours before your surgery    SURGICAL TIME  *You will be contacted between 2 p.m. and 6 p.m. the business day before your surgery with your arrival time.  *If you haven't received a call by 6pm, call [phone].  *Scheduled surgery times may change and you will be notified if this occurs-check your personal voicemail for any updates.    ON THE MORNING OF  SURGERY:  *Wear comfortable, loose fitting clothing.   *Do not use moisturizers, creams, lotions or perfume.  *All jewelry and valuables should be left at home.  *Prosthetic devices such as contact lenses, hearing aids, dentures, eyelash extensions, hairpins and body piercing must be removed before surgery.    BRING WITH YOU:  *Photo ID and insurance card  *Current list of medicines and allergies  *Pacemaker/Defibrillator/Heart stent cards  *CPAP machine and mask  *Slings/splints/crutches  *Copy of your complete Advanced Directive/DHPOA-if applicable  *Neurostimulator implant remote    PARKING AND ARRIVAL:  *Check in at the Main Entrance desk and let them know you are here for surgery.  *You will be directed to the 2nd floor surgical waiting area.    AFTER OUTPATIENT SURGERY:  *A responsible adult MUST accompany you at the time of discharge and stay with you for 24 hours after your surgery.  *You may NOT drive yourself home after surgery.  *You may use a taxi or ride sharing service ([employer], [employer]) to return home ONLY if you are accompanied by a friend or family member.  *Instructions for resuming your medications will be provided by your surgeon.

## 2024-12-12 NOTE — H&P (VIEW-ONLY)
[facility]/PAT Evaluation       Name: Mateo Leong (Mateo Leong)  /Age: 1985/39 y.o.       Date of Consult: 24    Referring Provider: Dr. Rivera    Surgery, Date, and Length:     Bilateral Sperm Retrieval - Bilateral   24, 90MIN    Mateo Leong is a 39 year-old male who presents to the [facility] for perioperative risk assessment prior to surgery.    Patient presents with a primary diagnosis of azoospermia. Semen analysis 24 shows zero sperm present. Pt has been trying to conceive with partner x 12 months.  No children from previous relationships.  No scrotal swelling or pain.  Pt wishes to proceed with sperm extraction as planned.     This note was created in part upon personal review of patient's medical records.      Patient is scheduled to have Bilateral Sperm Retrieval - Bilateral      Pt denies any past history of anesthetic complications such as PONV, awareness, prolonged sedation, dental damage, aspiration, cardiac arrest, difficult intubation, difficult I.V. access or unexpected hospital admissions.  NO malignant hyperthermia and or pseudocholinesterase deficiency.  No history of blood transfusions     The patient is not a Anglican and will accept blood and blood products if medically indicated.   Type and screen sent.     Past Medical History:   Diagnosis Date    Other acne 2004    Other hemorrhoids     Internal hemorrhoids    Personal history of other diseases of the digestive system     History of bloody stools    Right-sided low back pain without sciatica 2016       Past Surgical History:   Procedure Laterality Date    OTHER SURGICAL HISTORY  2022    Colonoscopy    WISDOM TOOTH EXTRACTION         Patient  has no history on file for sexual activity.    Family History   Problem Relation Name Age of Onset    Hypothyroidism Mother      Crohn's disease Father      Heart attack Paternal Grandfather  70 - 79    Cancer Paternal Grandfather       Social  History     Socioeconomic History    Marital status:      Spouse name: Not on file    Number of children: Not on file    Years of education: Not on file    Highest education level: Not on file   Occupational History    Not on file   Tobacco Use    Smoking status: Never     Passive exposure: Never    Smokeless tobacco: Former     Types: Chew     Quit date: 11/1/2024   Vaping Use    Vaping status: Never Used   Substance and Sexual Activity    Alcohol use: Yes     Alcohol/week: 5.0 standard drinks of alcohol     Types: 5 Standard drinks or equivalent per week     Comment: occasionally    Drug use: Never    Sexual activity: Not on file   Other Topics Concern    Not on file   Social History Narrative    Not on file     Social Drivers of Health     Financial Resource Strain: Not on file   Food Insecurity: Not on file   Transportation Needs: Not on file   Physical Activity: Not on file   Stress: Not on file   Social Connections: Not on file   Intimate Partner Violence: Not on file   Housing Stability: Not on file        Allergies   Allergen Reactions    Amoxicillin Rash       Current Outpatient Medications   Medication Instructions    NON FORMULARY 1 each, Daily    prenatal vitamin calcium-iron-folic 27 mg iron- 1 mg tablet 1 tablet, Daily            PAT ROS:   Constitutional:    no fever   no chills   no unexpected weight change  Neuro/Psych:    no numbness   no weakness   no light-headedness   no confusion  Eyes:    no discharge   no pain   no vision loss   no diplopia   no visual disturbance  Ears:    no ear pain   no hearing loss   no tinnitus  Nose:    no nasal discharge   no sinus congestion   no epistaxis  Mouth:    no dental issues   no mouth pain   no oral bleeding   no mouth lesions  Throat:    no throat pain   no dysphagia  Neck:    no neck pain   no neck stiffness  Cardio:    Functional 4 Mets. Patient denies SOB walking up 2 flights of stairs   [place] biking a few days per week; core work out 1-2 days  per week   no chest pain   no palpitations   no peripheral edema   no dyspnea   no PALAFOX  Respiratory:    no cough   no wheezing   no hemoptysis   no shortness of breath  Endocrine:    no cold intolerance   no heat intolerance  GI:    no abdominal distention   no abdominal pain   constipation   no diarrhea   no nausea   no vomiting   no blood in stool  :    no difficulty urinating   no dysuria   no oliguria  Musculoskeletal:    arthralgias (LBP)   no myalgias   no decreased ROM  Hematologic:    does not bruise/bleed easily   no excessive bleeding   no history of blood transfusion   no blood clots  Skin:   no skin changes   no sores/wound   no rash      Physical Exam  Constitutional:       General: He is not in acute distress.     Appearance: Normal appearance. He is not ill-appearing, toxic-appearing or diaphoretic.   HENT:      Head: Normocephalic and atraumatic.      Nose: Nose normal. No congestion or rhinorrhea.      Mouth/Throat:      Mouth: Mucous membranes are moist.      Pharynx: No posterior oropharyngeal erythema.   Eyes:      Extraocular Movements: Extraocular movements intact.      Conjunctiva/sclera: Conjunctivae normal.   Cardiovascular:      Rate and Rhythm: Normal rate and regular rhythm.      Pulses: Normal pulses.      Heart sounds: Normal heart sounds. No murmur heard.     No friction rub. No gallop.   Pulmonary:      Effort: Pulmonary effort is normal. No respiratory distress.      Breath sounds: Normal breath sounds. No stridor. No wheezing, rhonchi or rales.   Abdominal:      General: Bowel sounds are normal. There is no distension.      Palpations: Abdomen is soft. There is no mass.      Tenderness: There is no abdominal tenderness. There is no guarding or rebound.      Hernia: No hernia is present.   Musculoskeletal:         General: No swelling, tenderness, deformity or signs of injury. Normal range of motion.      Cervical back: Normal range of motion and neck supple. No rigidity or  "tenderness.   Skin:     General: Skin is warm and dry.      Coloration: Skin is not jaundiced or pale.      Findings: No bruising, erythema, lesion or rash.   Neurological:      General: No focal deficit present.      Mental Status: He is alert and oriented to person, place, and time.      Cranial Nerves: No cranial nerve deficit.      Sensory: No sensory deficit.      Motor: No weakness.      Coordination: Coordination normal.   Psychiatric:         Mood and Affect: Mood normal.         Behavior: Behavior normal.          PAT AIRWAY:   Airway:     Mallampati::  II    Neck ROM::  Full   No broken teeth, no dentures and no missing teeth            Visit Vitals  /83   Pulse 86   Temp 37 °C (98.6 °F)   Resp 16   Ht 1.867 m (6' 1.5\")   Wt 113 kg (249 lb 1.9 oz)   SpO2 97%   BMI 32.42 kg/m²   Smoking Status Never   BSA 2.42 m²      LABS:  Lab Results   Component Value Date    WBC 5.2 12/12/2024    HGB 14.0 12/12/2024    HCT 40.3 (L) 12/12/2024    MCV 92 12/12/2024     12/12/2024      Lab Results   Component Value Date    GLUCOSE 100 (H) 12/12/2024    CALCIUM 9.0 12/12/2024     12/12/2024    K 4.2 12/12/2024    CO2 27 12/12/2024     12/12/2024    BUN 13 12/12/2024    CREATININE 0.83 12/12/2024      Urinalysis with Reflex Culture and Microscopic  Order: [id number] - Part of Panel Order [id number]   Status: Final result       Visible to patient: Yes (seen)       Dx: Preop testing    0 Result Notes      Component  Ref Range & Units 1 d ago   Color, Urine  Light-Yellow, Yellow, Dark-Yellow Light-Yellow   Appearance, Urine  Clear Clear   Specific Gravity, Urine  1.005 - 1.035 1.014   pH, Urine  5.0, 5.5, 6.0, 6.5, 7.0, 7.5, 8.0 7.0   Protein, Urine  NEGATIVE, 10 (TRACE), 20 (TRACE) mg/dL NEGATIVE   Glucose, Urine  Normal mg/dL Normal   Blood, Urine  NEGATIVE NEGATIVE   Ketones, Urine  NEGATIVE mg/dL NEGATIVE   Bilirubin, Urine  NEGATIVE NEGATIVE   Urobilinogen, Urine  Normal mg/dL Normal   Nitrite, " Urine  NEGATIVE NEGATIVE   Leukocyte Esterase, Urine  NEGATIVE NEGATIVE   Resulting Agency [facility]             Specimen Collected: 12/12/24 15:42       Assessment and Plan:     Patient is a 39-year-old male scheduled for a Bilateral Sperm Retrieval - Bilateral with Dr. Rivera on 12/19/24.     Patient has no active cardiac symptoms.   Patient denies any chest pain, tightness, heaviness, pressure, radiating pain, palpitations, irregular heartbeats, lightheadedness, cough, congestion, shortness of breath, PALAFOX, PND, near syncope, weight loss or gain.     RCRI  0  , 3.9 % Risk of MACE    Cardiac:  HLD - pt not currently on any lipid lowering meds     Pulmonary:  Asthma - cont inhalers as prescribed - mainly exercise induced      Hematology:  Patient instructed to ambulate as soon as possible postoperatively to decrease thromboembolic risk.   Initiate mechanical DVT prophylaxis as soon as possible and initiate chemical prophylaxis when deemed safe from a bleeding standpoint post surgery.     LABS: CBC, BMP, UA flex ordered    Followup: Labs pending    Addendum 12/13/24:  Lab results reviewed and unremarkable    STOP BANG: male = 1    Caprini: 3    Risk assessment complete.  Patient is scheduled for a low surgical risk procedure.        Preoperative medication instructions were provided and reviewed with the patient.  Any additional testing or evaluation was explained to the patient.  Nothing by mouth instructions were discussed and patient's questions were answered prior to conclusion to this encounter.  Patient verbalized understanding of preoperative instructions given in preadmission testing; discharge instructions available in EMR.    This note was dictated by a speech recognition.  Minor errors may have been detected in a speech recognition.

## 2024-12-12 NOTE — CPM/PAT H&P
[facility]/PAT Evaluation       Name: Mateo Leong (Mateo Leong)  /Age: 1985/39 y.o.       Date of Consult: 24    Referring Provider: Dr. Rivera    Surgery, Date, and Length:     Bilateral Sperm Retrieval - Bilateral   24, 90MIN    Mateo Leong is a 39 year-old male who presents to the [facility] for perioperative risk assessment prior to surgery.    Patient presents with a primary diagnosis of azoospermia. Semen analysis 24 shows zero sperm present. Pt has been trying to conceive with partner x 12 months.  No children from previous relationships.  No scrotal swelling or pain.  Pt wishes to proceed with sperm extraction as planned.     This note was created in part upon personal review of patient's medical records.      Patient is scheduled to have Bilateral Sperm Retrieval - Bilateral      Pt denies any past history of anesthetic complications such as PONV, awareness, prolonged sedation, dental damage, aspiration, cardiac arrest, difficult intubation, difficult I.V. access or unexpected hospital admissions.  NO malignant hyperthermia and or pseudocholinesterase deficiency.  No history of blood transfusions     The patient is not a Mu-ism and will accept blood and blood products if medically indicated.   Type and screen sent.     Past Medical History:   Diagnosis Date    Other acne 2004    Other hemorrhoids     Internal hemorrhoids    Personal history of other diseases of the digestive system     History of bloody stools    Right-sided low back pain without sciatica 2016       Past Surgical History:   Procedure Laterality Date    OTHER SURGICAL HISTORY  2022    Colonoscopy    WISDOM TOOTH EXTRACTION         Patient  has no history on file for sexual activity.    Family History   Problem Relation Name Age of Onset    Hypothyroidism Mother      Crohn's disease Father      Heart attack Paternal Grandfather  70 - 79    Cancer Paternal Grandfather       Social  History     Socioeconomic History    Marital status:      Spouse name: Not on file    Number of children: Not on file    Years of education: Not on file    Highest education level: Not on file   Occupational History    Not on file   Tobacco Use    Smoking status: Never     Passive exposure: Never    Smokeless tobacco: Former     Types: Chew     Quit date: 11/1/2024   Vaping Use    Vaping status: Never Used   Substance and Sexual Activity    Alcohol use: Yes     Alcohol/week: 5.0 standard drinks of alcohol     Types: 5 Standard drinks or equivalent per week     Comment: occasionally    Drug use: Never    Sexual activity: Not on file   Other Topics Concern    Not on file   Social History Narrative    Not on file     Social Drivers of Health     Financial Resource Strain: Not on file   Food Insecurity: Not on file   Transportation Needs: Not on file   Physical Activity: Not on file   Stress: Not on file   Social Connections: Not on file   Intimate Partner Violence: Not on file   Housing Stability: Not on file        Allergies   Allergen Reactions    Amoxicillin Rash       Current Outpatient Medications   Medication Instructions    NON FORMULARY 1 each, Daily    prenatal vitamin calcium-iron-folic 27 mg iron- 1 mg tablet 1 tablet, Daily            PAT ROS:   Constitutional:    no fever   no chills   no unexpected weight change  Neuro/Psych:    no numbness   no weakness   no light-headedness   no confusion  Eyes:    no discharge   no pain   no vision loss   no diplopia   no visual disturbance  Ears:    no ear pain   no hearing loss   no tinnitus  Nose:    no nasal discharge   no sinus congestion   no epistaxis  Mouth:    no dental issues   no mouth pain   no oral bleeding   no mouth lesions  Throat:    no throat pain   no dysphagia  Neck:    no neck pain   no neck stiffness  Cardio:    Functional 4 Mets. Patient denies SOB walking up 2 flights of stairs   [place] biking a few days per week; core work out 1-2 days  per week   no chest pain   no palpitations   no peripheral edema   no dyspnea   no PALAFOX  Respiratory:    no cough   no wheezing   no hemoptysis   no shortness of breath  Endocrine:    no cold intolerance   no heat intolerance  GI:    no abdominal distention   no abdominal pain   constipation   no diarrhea   no nausea   no vomiting   no blood in stool  :    no difficulty urinating   no dysuria   no oliguria  Musculoskeletal:    arthralgias (LBP)   no myalgias   no decreased ROM  Hematologic:    does not bruise/bleed easily   no excessive bleeding   no history of blood transfusion   no blood clots  Skin:   no skin changes   no sores/wound   no rash      Physical Exam  Constitutional:       General: He is not in acute distress.     Appearance: Normal appearance. He is not ill-appearing, toxic-appearing or diaphoretic.   HENT:      Head: Normocephalic and atraumatic.      Nose: Nose normal. No congestion or rhinorrhea.      Mouth/Throat:      Mouth: Mucous membranes are moist.      Pharynx: No posterior oropharyngeal erythema.   Eyes:      Extraocular Movements: Extraocular movements intact.      Conjunctiva/sclera: Conjunctivae normal.   Cardiovascular:      Rate and Rhythm: Normal rate and regular rhythm.      Pulses: Normal pulses.      Heart sounds: Normal heart sounds. No murmur heard.     No friction rub. No gallop.   Pulmonary:      Effort: Pulmonary effort is normal. No respiratory distress.      Breath sounds: Normal breath sounds. No stridor. No wheezing, rhonchi or rales.   Abdominal:      General: Bowel sounds are normal. There is no distension.      Palpations: Abdomen is soft. There is no mass.      Tenderness: There is no abdominal tenderness. There is no guarding or rebound.      Hernia: No hernia is present.   Musculoskeletal:         General: No swelling, tenderness, deformity or signs of injury. Normal range of motion.      Cervical back: Normal range of motion and neck supple. No rigidity or  "tenderness.   Skin:     General: Skin is warm and dry.      Coloration: Skin is not jaundiced or pale.      Findings: No bruising, erythema, lesion or rash.   Neurological:      General: No focal deficit present.      Mental Status: He is alert and oriented to person, place, and time.      Cranial Nerves: No cranial nerve deficit.      Sensory: No sensory deficit.      Motor: No weakness.      Coordination: Coordination normal.   Psychiatric:         Mood and Affect: Mood normal.         Behavior: Behavior normal.          PAT AIRWAY:   Airway:     Mallampati::  II    Neck ROM::  Full   No broken teeth, no dentures and no missing teeth            Visit Vitals  /83   Pulse 86   Temp 37 °C (98.6 °F)   Resp 16   Ht 1.867 m (6' 1.5\")   Wt 113 kg (249 lb 1.9 oz)   SpO2 97%   BMI 32.42 kg/m²   Smoking Status Never   BSA 2.42 m²      LABS:  Lab Results   Component Value Date    WBC 5.2 12/12/2024    HGB 14.0 12/12/2024    HCT 40.3 (L) 12/12/2024    MCV 92 12/12/2024     12/12/2024      Lab Results   Component Value Date    GLUCOSE 100 (H) 12/12/2024    CALCIUM 9.0 12/12/2024     12/12/2024    K 4.2 12/12/2024    CO2 27 12/12/2024     12/12/2024    BUN 13 12/12/2024    CREATININE 0.83 12/12/2024      Urinalysis with Reflex Culture and Microscopic  Order: [id number] - Part of Panel Order [id number]   Status: Final result       Visible to patient: Yes (seen)       Dx: Preop testing    0 Result Notes      Component  Ref Range & Units 1 d ago   Color, Urine  Light-Yellow, Yellow, Dark-Yellow Light-Yellow   Appearance, Urine  Clear Clear   Specific Gravity, Urine  1.005 - 1.035 1.014   pH, Urine  5.0, 5.5, 6.0, 6.5, 7.0, 7.5, 8.0 7.0   Protein, Urine  NEGATIVE, 10 (TRACE), 20 (TRACE) mg/dL NEGATIVE   Glucose, Urine  Normal mg/dL Normal   Blood, Urine  NEGATIVE NEGATIVE   Ketones, Urine  NEGATIVE mg/dL NEGATIVE   Bilirubin, Urine  NEGATIVE NEGATIVE   Urobilinogen, Urine  Normal mg/dL Normal   Nitrite, " Urine  NEGATIVE NEGATIVE   Leukocyte Esterase, Urine  NEGATIVE NEGATIVE   Resulting Agency [facility]             Specimen Collected: 12/12/24 15:42       Assessment and Plan:     Patient is a 39-year-old male scheduled for a Bilateral Sperm Retrieval - Bilateral with Dr. Rivera on 12/19/24.     Patient has no active cardiac symptoms.   Patient denies any chest pain, tightness, heaviness, pressure, radiating pain, palpitations, irregular heartbeats, lightheadedness, cough, congestion, shortness of breath, PALAFOX, PND, near syncope, weight loss or gain.     RCRI  0  , 3.9 % Risk of MACE    Cardiac:  HLD - pt not currently on any lipid lowering meds     Pulmonary:  Asthma - cont inhalers as prescribed - mainly exercise induced      Hematology:  Patient instructed to ambulate as soon as possible postoperatively to decrease thromboembolic risk.   Initiate mechanical DVT prophylaxis as soon as possible and initiate chemical prophylaxis when deemed safe from a bleeding standpoint post surgery.     LABS: CBC, BMP, UA flex ordered    Followup: Labs pending    Addendum 12/13/24:  Lab results reviewed and unremarkable    STOP BANG: male = 1    Caprini: 3    Risk assessment complete.  Patient is scheduled for a low surgical risk procedure.        Preoperative medication instructions were provided and reviewed with the patient.  Any additional testing or evaluation was explained to the patient.  Nothing by mouth instructions were discussed and patient's questions were answered prior to conclusion to this encounter.  Patient verbalized understanding of preoperative instructions given in preadmission testing; discharge instructions available in EMR.    This note was dictated by a speech recognition.  Minor errors may have been detected in a speech recognition.

## 2024-12-18 DIAGNOSIS — N46.01 AZOOSPERMIA: ICD-10-CM

## 2024-12-19 ENCOUNTER — ANCILLARY PROCEDURE (OUTPATIENT)
Dept: ENDOCRINOLOGY | Facility: CLINIC | Age: 39
End: 2024-12-19
Payer: COMMERCIAL

## 2024-12-19 ENCOUNTER — ANESTHESIA EVENT (OUTPATIENT)
Dept: OPERATING ROOM | Facility: HOSPITAL | Age: 39
End: 2024-12-19
Payer: COMMERCIAL

## 2024-12-19 ENCOUNTER — ANESTHESIA (OUTPATIENT)
Dept: OPERATING ROOM | Facility: HOSPITAL | Age: 39
End: 2024-12-19
Payer: COMMERCIAL

## 2024-12-19 ENCOUNTER — HOSPITAL ENCOUNTER (OUTPATIENT)
Facility: HOSPITAL | Age: 39
Setting detail: OUTPATIENT SURGERY
Discharge: HOME | End: 2024-12-19
Attending: UROLOGY | Admitting: UROLOGY
Payer: COMMERCIAL

## 2024-12-19 VITALS
HEART RATE: 64 BPM | SYSTOLIC BLOOD PRESSURE: 136 MMHG | TEMPERATURE: 96.8 F | DIASTOLIC BLOOD PRESSURE: 86 MMHG | OXYGEN SATURATION: 95 % | RESPIRATION RATE: 16 BRPM

## 2024-12-19 DIAGNOSIS — N46.01 AZOOSPERMIA: Primary | ICD-10-CM

## 2024-12-19 DIAGNOSIS — N46.01 AZOOSPERMIA: ICD-10-CM

## 2024-12-19 DIAGNOSIS — R79.89 LOW TESTOSTERONE: ICD-10-CM

## 2024-12-19 LAB
# OF VIALS S1: 0
ANALYZED TIME:: NORMAL
ANDROLOGY LAB ID#: NORMAL
COLLECTION LOCATION:: NORMAL
CONCENTRATION S1: 0
Lab: 0
Lab: 0
Lab: NORMAL
Lab: NORMAL
RECEIVED TIME:: NORMAL
SAMPLE SOURCE #1: NORMAL
VOLUME S1: NORMAL

## 2024-12-19 PROCEDURE — 3700000001 HC GENERAL ANESTHESIA TIME - INITIAL BASE CHARGE: Performed by: UROLOGY

## 2024-12-19 PROCEDURE — 3600000006 HC OR TIME - EACH INCREMENTAL 1 MINUTE - PROCEDURE LEVEL ONE: Performed by: UROLOGY

## 2024-12-19 PROCEDURE — 3600000001 HC OR TIME - INITIAL BASE CHARGE - PROCEDURE LEVEL ONE: Performed by: UROLOGY

## 2024-12-19 PROCEDURE — 7100000010 HC PHASE TWO TIME - EACH INCREMENTAL 1 MINUTE: Performed by: UROLOGY

## 2024-12-19 PROCEDURE — 55899 UNLISTED PX MALE GENITAL SYS: CPT | Performed by: UROLOGY

## 2024-12-19 PROCEDURE — 88342 IMHCHEM/IMCYTCHM 1ST ANTB: CPT | Performed by: PATHOLOGY

## 2024-12-19 PROCEDURE — 2720000007 HC OR 272 NO HCPCS: Performed by: UROLOGY

## 2024-12-19 PROCEDURE — 2500000004 HC RX 250 GENERAL PHARMACY W/ HCPCS (ALT 636 FOR OP/ED): Performed by: UROLOGY

## 2024-12-19 PROCEDURE — 3700000002 HC GENERAL ANESTHESIA TIME - EACH INCREMENTAL 1 MINUTE: Performed by: UROLOGY

## 2024-12-19 PROCEDURE — 2500000001 HC RX 250 WO HCPCS SELF ADMINISTERED DRUGS (ALT 637 FOR MEDICARE OP): Performed by: UROLOGY

## 2024-12-19 PROCEDURE — 7100000009 HC PHASE TWO TIME - INITIAL BASE CHARGE: Performed by: UROLOGY

## 2024-12-19 PROCEDURE — 54505 BIOPSY OF TESTIS: CPT | Performed by: UROLOGY

## 2024-12-19 PROCEDURE — 2500000005 HC RX 250 GENERAL PHARMACY W/O HCPCS: Performed by: UROLOGY

## 2024-12-19 PROCEDURE — 89264 IDENTIFY SPERM TISSUE: CPT | Performed by: STUDENT IN AN ORGANIZED HEALTH CARE EDUCATION/TRAINING PROGRAM

## 2024-12-19 PROCEDURE — 88342 IMHCHEM/IMCYTCHM 1ST ANTB: CPT | Mod: TC,AHULAB | Performed by: UROLOGY

## 2024-12-19 PROCEDURE — 7100000001 HC RECOVERY ROOM TIME - INITIAL BASE CHARGE: Performed by: UROLOGY

## 2024-12-19 PROCEDURE — 2500000001 HC RX 250 WO HCPCS SELF ADMINISTERED DRUGS (ALT 637 FOR MEDICARE OP): Performed by: STUDENT IN AN ORGANIZED HEALTH CARE EDUCATION/TRAINING PROGRAM

## 2024-12-19 PROCEDURE — 7100000002 HC RECOVERY ROOM TIME - EACH INCREMENTAL 1 MINUTE: Performed by: UROLOGY

## 2024-12-19 PROCEDURE — 88307 TISSUE EXAM BY PATHOLOGIST: CPT | Performed by: PATHOLOGY

## 2024-12-19 PROCEDURE — 2500000004 HC RX 250 GENERAL PHARMACY W/ HCPCS (ALT 636 FOR OP/ED)

## 2024-12-19 RX ORDER — DIPHENHYDRAMINE HYDROCHLORIDE 50 MG/ML
12.5 INJECTION INTRAMUSCULAR; INTRAVENOUS ONCE AS NEEDED
Status: DISCONTINUED | OUTPATIENT
Start: 2024-12-19 | End: 2024-12-19 | Stop reason: HOSPADM

## 2024-12-19 RX ORDER — CEFAZOLIN SODIUM 2 G/100ML
2 INJECTION, SOLUTION INTRAVENOUS ONCE
Status: DISCONTINUED | OUTPATIENT
Start: 2024-12-19 | End: 2024-12-19 | Stop reason: HOSPADM

## 2024-12-19 RX ORDER — FENTANYL CITRATE 50 UG/ML
INJECTION, SOLUTION INTRAMUSCULAR; INTRAVENOUS AS NEEDED
Status: DISCONTINUED | OUTPATIENT
Start: 2024-12-19 | End: 2024-12-19

## 2024-12-19 RX ORDER — OXYCODONE HYDROCHLORIDE 5 MG/1
5 TABLET ORAL EVERY 6 HOURS PRN
Qty: 12 TABLET | Refills: 0 | Status: SHIPPED | OUTPATIENT
Start: 2024-12-19 | End: 2024-12-22

## 2024-12-19 RX ORDER — ONDANSETRON HYDROCHLORIDE 2 MG/ML
INJECTION, SOLUTION INTRAVENOUS AS NEEDED
Status: DISCONTINUED | OUTPATIENT
Start: 2024-12-19 | End: 2024-12-19

## 2024-12-19 RX ORDER — PROPOFOL 10 MG/ML
INJECTION, EMULSION INTRAVENOUS AS NEEDED
Status: DISCONTINUED | OUTPATIENT
Start: 2024-12-19 | End: 2024-12-19

## 2024-12-19 RX ORDER — POLYETHYLENE GLYCOL 3350 17 G/17G
17 POWDER, FOR SOLUTION ORAL DAILY
Qty: 7 PACKET | Refills: 0 | Status: SHIPPED | OUTPATIENT
Start: 2024-12-19 | End: 2024-12-26

## 2024-12-19 RX ORDER — SODIUM CHLORIDE, SODIUM LACTATE, POTASSIUM CHLORIDE, CALCIUM CHLORIDE 600; 310; 30; 20 MG/100ML; MG/100ML; MG/100ML; MG/100ML
20 INJECTION, SOLUTION INTRAVENOUS CONTINUOUS
Status: DISCONTINUED | OUTPATIENT
Start: 2024-12-19 | End: 2024-12-19 | Stop reason: HOSPADM

## 2024-12-19 RX ORDER — CEFAZOLIN 1 G/1
INJECTION, POWDER, FOR SOLUTION INTRAVENOUS AS NEEDED
Status: DISCONTINUED | OUTPATIENT
Start: 2024-12-19 | End: 2024-12-19

## 2024-12-19 RX ORDER — MIDAZOLAM HYDROCHLORIDE 1 MG/ML
INJECTION INTRAMUSCULAR; INTRAVENOUS AS NEEDED
Status: DISCONTINUED | OUTPATIENT
Start: 2024-12-19 | End: 2024-12-19

## 2024-12-19 RX ORDER — LIDOCAINE HYDROCHLORIDE 10 MG/ML
0.1 INJECTION, SOLUTION EPIDURAL; INFILTRATION; INTRACAUDAL; PERINEURAL ONCE
Status: DISCONTINUED | OUTPATIENT
Start: 2024-12-19 | End: 2024-12-19 | Stop reason: HOSPADM

## 2024-12-19 RX ORDER — SODIUM CHLORIDE 0.9 G/100ML
IRRIGANT IRRIGATION AS NEEDED
Status: DISCONTINUED | OUTPATIENT
Start: 2024-12-19 | End: 2024-12-19 | Stop reason: HOSPADM

## 2024-12-19 RX ORDER — SODIUM CHLORIDE, SODIUM LACTATE, POTASSIUM CHLORIDE, CALCIUM CHLORIDE 600; 310; 30; 20 MG/100ML; MG/100ML; MG/100ML; MG/100ML
100 INJECTION, SOLUTION INTRAVENOUS CONTINUOUS
Status: DISCONTINUED | OUTPATIENT
Start: 2024-12-19 | End: 2024-12-19 | Stop reason: HOSPADM

## 2024-12-19 RX ORDER — ONDANSETRON HYDROCHLORIDE 2 MG/ML
4 INJECTION, SOLUTION INTRAVENOUS ONCE AS NEEDED
Status: DISCONTINUED | OUTPATIENT
Start: 2024-12-19 | End: 2024-12-19 | Stop reason: HOSPADM

## 2024-12-19 RX ORDER — CELECOXIB 200 MG/1
400 CAPSULE ORAL ONCE
Status: COMPLETED | OUTPATIENT
Start: 2024-12-19 | End: 2024-12-19

## 2024-12-19 RX ORDER — LIDOCAINE HYDROCHLORIDE 20 MG/ML
INJECTION, SOLUTION EPIDURAL; INFILTRATION; INTRACAUDAL; PERINEURAL AS NEEDED
Status: DISCONTINUED | OUTPATIENT
Start: 2024-12-19 | End: 2024-12-19

## 2024-12-19 RX ORDER — ACETAMINOPHEN 325 MG/1
975 TABLET ORAL ONCE
Status: COMPLETED | OUTPATIENT
Start: 2024-12-19 | End: 2024-12-19

## 2024-12-19 RX ORDER — GABAPENTIN 300 MG/1
600 CAPSULE ORAL ONCE
Status: COMPLETED | OUTPATIENT
Start: 2024-12-19 | End: 2024-12-19

## 2024-12-19 RX ORDER — PHENYLEPHRINE HCL IN 0.9% NACL 0.4MG/10ML
SYRINGE (ML) INTRAVENOUS AS NEEDED
Status: DISCONTINUED | OUTPATIENT
Start: 2024-12-19 | End: 2024-12-19

## 2024-12-19 RX ORDER — LABETALOL HYDROCHLORIDE 5 MG/ML
5 INJECTION, SOLUTION INTRAVENOUS ONCE AS NEEDED
Status: DISCONTINUED | OUTPATIENT
Start: 2024-12-19 | End: 2024-12-19 | Stop reason: HOSPADM

## 2024-12-19 RX ORDER — OXYCODONE HYDROCHLORIDE 5 MG/1
5 TABLET ORAL EVERY 4 HOURS PRN
Status: DISCONTINUED | OUTPATIENT
Start: 2024-12-19 | End: 2024-12-19 | Stop reason: HOSPADM

## 2024-12-19 RX ORDER — DOCUSATE SODIUM 100 MG/1
100 CAPSULE, LIQUID FILLED ORAL 2 TIMES DAILY
Qty: 14 CAPSULE | Refills: 0 | Status: SHIPPED | OUTPATIENT
Start: 2024-12-19 | End: 2024-12-26

## 2024-12-19 RX ADMIN — ACETAMINOPHEN 975 MG: 325 TABLET, FILM COATED ORAL at 06:40

## 2024-12-19 RX ADMIN — CELECOXIB 400 MG: 200 CAPSULE ORAL at 06:40

## 2024-12-19 RX ADMIN — OXYCODONE HYDROCHLORIDE 5 MG: 5 TABLET ORAL at 10:08

## 2024-12-19 RX ADMIN — GABAPENTIN 600 MG: 300 CAPSULE ORAL at 06:39

## 2024-12-19 ASSESSMENT — PAIN SCALES - GENERAL
PAINLEVEL_OUTOF10: 1
PAINLEVEL_OUTOF10: 0 - NO PAIN
PAIN_LEVEL: 0
PAINLEVEL_OUTOF10: 0 - NO PAIN
PAINLEVEL_OUTOF10: 3
PAINLEVEL_OUTOF10: 0 - NO PAIN

## 2024-12-19 ASSESSMENT — PAIN - FUNCTIONAL ASSESSMENT
PAIN_FUNCTIONAL_ASSESSMENT: 0-10

## 2024-12-19 ASSESSMENT — COLUMBIA-SUICIDE SEVERITY RATING SCALE - C-SSRS
1. IN THE PAST MONTH, HAVE YOU WISHED YOU WERE DEAD OR WISHED YOU COULD GO TO SLEEP AND NOT WAKE UP?: NO
2. HAVE YOU ACTUALLY HAD ANY THOUGHTS OF KILLING YOURSELF?: NO
6. HAVE YOU EVER DONE ANYTHING, STARTED TO DO ANYTHING, OR PREPARED TO DO ANYTHING TO END YOUR LIFE?: NO

## 2024-12-19 NOTE — DISCHARGE INSTRUCTIONS
DEPARTMENT OF Urology  DISCHARGE INSTRUCTIONS Microscopic testicular sperm extraction  Outpatient Surgery    C O N F I D E N T I A L   I N F O R M A T I O N    Mateo Leong    Call Urology Department [phone] regular daytime business hours (8:00 am - 5:00 pm) and after 5:00 pm, ask for the Urology resident with any questions or concerns.      If it is a life-threatening situation, proceed to the nearest emergency department.        Follow-up appointment:    Future Appointments   Date Time Provider Department Center   12/19/2024 10:30 AM [facility] [id number] MANISHA ANDROLOGY IVF RESOURCE [id number] [facility]   1/22/2025 10:10 AM Gian Rivera MD [id number] [facility]          Thank you for the opportunity to care for you today.  Your health and healing are very important to us.  We hope we made you feel as comfortable as possible and are committed to your recovery and continued well-being.      The following is a brief overview of your procedure today. Some of the information contained on this summary may be confidential.  This information should be kept in your records and should be shared with your regular doctor.    Physicians:   Dr. Rivera     Procedure performed: Microscopic testicular sperm extraction    What to Expect During your Recovery and Home Care  Anesthesia Side Effects   You received anesthesia today.  You may feel sleepy, tired, or have a sore throat.   You may also feel drowsiness, dizziness, or inability to think clearly.  For your safety, do not drive, drink alcoholic beverages, take any unprescribed medication or make any important decisions for 24 hours.  A responsible adult should be with you for 24 hours.       Activity and Recovery    The evening after your surgery:  1. Go home and rest. Stay off your feet. Apply an ice pack to the scrotum. Place the ice pack on top of the scrotal support. Do not apply the ice pack directly to the scrotal skin.  2. After four hours, check the scrotum for  any swelling or blue discoloration. It is normal to see swelling up to the size of a plum. It is normal to see some blue discoloration to the scrotal skin, which is due to the injection of the local anesthetic. It is NOT normal for the scrotum to look like an eggplant. If the scrotum looks like an eggplant, immediately call [facility] [place] at [phone] and ask to speak to the Urology Resident on call.     The day after surgery:  1. You are allowed to resume as much activity as you can tolerate. If you have pain, it usually indicates you are doing too much. Decrease your activity. Avoid strenuous activity including heavy lifting for a week.   2. No sex/masturbation or ejaculation until cleared at postoperative follow-up  3. Wear scrotal support for 7-10 days. This provides support to the incisions and decreases the movement of the testicles and will diminish the amount of post-operative pain.    Pain Control  Unfortunately, you may experience pain after your procedure.  Adequate management can include alternative measures to help ease your pain and can include ice packs, scrotal support, and over the counter tylenol. Do not take more than 4,000 mg of tylenol in a 24 hour period.  Oxycodone has also been prescribed, this is a narcotic and heavy machinery, driving should not be done while taking this medication.    Nausea/Vomiting   Clear liquids are best tolerated at first. Start slow, advance your diet as tolerated to normal foods. Avoid spicy, greasy, heavy foods at first. Also, you may feel nauseous or like you need to vomit if you take any type of medication on an empty stomach.  Call your physician if you are unable to eat or drink and have persistent vomiting.          Signs of Bleeding   Minor bleeding or drainage may occur from the surgical site, however, excessive or consistent bleeding should be reported to your surgeon. Excessive bleeding is defined as blood that is dripping from  wound, soaking you bandages, and is ketchup colored, thick with possible blood clots.  Consistent is defined as bleeding that does not stop.    Treatment/wound care:   Keep area(s) clean and dry. You can wear gauze dressing on top of the incisions so the scrotal support does not irritate the incisions.   It is okay to shower 24 hours after time of surgery.  Wait three days before taking a bath. No swimming until the incision are completely healed.   Do not scrub wound(s), pat dry.  Clean with mild soap. Do not use oils or lotions on incisions.    Please visually inspect your wound(s) at least once daily.  If the wound(s) are in a difficult to see location, please use a mirror or have someone else assist with visual inspection.    Signs of Infection  Signs of infection can include fever, excessive swelling, heat, drainage, redness, or severe pain.  If you see any of these occur, please contact your doctor's office at [phone] during regular business hours 8AM-5PM or if after 5PM [phone] and ask for the Urology resident on call.  Any fever higher than 100.4, especially if associated with an ill feeling, abdominal pain, chills, or nausea should be reported to your surgeon.

## 2024-12-19 NOTE — ANESTHESIA PROCEDURE NOTES
Airway  Date/Time: 12/19/2024 7:36 AM  Urgency: elective    Airway not difficult    Staffing  Performed: CAA   Authorized by: Efren Woodall MD    Performed by: GEOVANNA Enriquez  Patient location during procedure: OR    Indications and Patient Condition  Indications for airway management: anesthesia  Sedation level: deep  Preoxygenated: yes  Mask difficulty assessment: 0 - not attempted    Final Airway Details  Final airway type: supraglottic airway      Successful airway: Size 5     Number of attempts at approach: 1    Additional Comments  Igel 5, easy placement

## 2024-12-19 NOTE — ANESTHESIA POSTPROCEDURE EVALUATION
Patient: Mateo Leong    Procedure Summary       Date: 12/19/24 Room / Location: [facility] A OR 01 / Virtual [facility] A OR    Anesthesia Start: 0730 Anesthesia Stop: 0957    Procedure: Bilateral Microscopic aided Sperm Retrieval; Bilateral Testis Biopsy (Bilateral: Scrotum) Diagnosis:       Azoospermia      (Azoospermia [N46.01])    Surgeons: Gian Rivera MD Responsible Provider: Efren Woodall MD    Anesthesia Type: general ASA Status: 2            Anesthesia Type: general    Vitals Value Taken Time   /88 12/19/24 0954   Temp 36 °C (96.8 °F) 12/19/24 0950   Pulse 87 12/19/24 0958   Resp 16 12/19/24 0950   SpO2 97 % 12/19/24 0958   Vitals shown include unfiled device data.    Anesthesia Post Evaluation    Patient location during evaluation: PACU  Patient participation: complete - patient participated  Level of consciousness: awake and alert  Pain score: 0  Pain management: adequate  Airway patency: patent  Cardiovascular status: acceptable  Respiratory status: acceptable and room air  Hydration status: acceptable  Postoperative Nausea and Vomiting: none        There were no known notable events for this encounter.

## 2024-12-19 NOTE — OP NOTE
Bilateral Microscopic aided Sperm Retrieval; Bilateral Testis Biopsy (B) Operative Note     Date: 2024  OR Location: [facility] A OR    Name: Mateo Leong, : 1985, Age: 39 y.o., MRN: [id number], Sex: male    Diagnosis  Pre-op Diagnosis      * Azoospermia [N46.01] Post-op Diagnosis     * Azoospermia [N46.01]     Procedures  Bilateral Microscopic aided Sperm Retrieval; Bilateral Testis Biopsy  22362 - LA UNLISTED PROCEDURE MALE GENITAL SYSTEM    LA MICROSURG TQS REQ USE OPERATING MICROSCOPE [24426]  LA SCROTAL EXPLORATION [55394]  LA BIOPSY TESTIS INCISIONAL SEPARATE PROCEDURE [13866]    Scrotal Exploration, Bilateral micro-TESE, bilateral testicular biopsy  Surgeons      * Gian Rivera - Primary    Resident/Fellow/Other Assistant:  Surgeons and Role:     * Anna Gomes MD - Resident - Assisting    Staff:   Circulator: Brenda Lucas Person: Carlotta Viveros Circulator: Valeria    Anesthesia Staff: Anesthesiologist: Efren Woodall MD  C-AA: GEOVANNA Bravo; GEOVANNA Enriquez    Procedure Summary  Anesthesia: General  ASA: II  Estimated Blood Loss: 2mL  Intra-op Medications:   Administrations occurring from 0730 to 0930 on 24:   Medication Name Total Dose   sodium chloride 0.9 % irrigation solution 1,000 mL   BUPivacaine HCl (Marcaine) 0.5 % (5 mg/mL) 20 mL, lidocaine (Xylocaine) 20 mL syringe 30 mL   ceFAZolin (Ancef) vial 1 g 2 g   dexAMETHasone (Decadron) injection 4 mg/mL 4 mg   fentaNYL (Sublimaze) injection 50 mcg/mL 100 mcg   glycopyrrolate PF (Robinul) injection 0.2 mg   LR bolus Cannot be calculated   lidocaine PF (Xylocaine-MPF) local injection 2 % 100 mg   phenylephrine 40 mcg/mL syringe 10 mL 100 mcg   propofol (Diprivan) injection 10 mg/mL 200 mg              Anesthesia Record               Intraprocedure I/O Totals       None           Specimen:   ID Type Source Tests Collected by Time   1 : LEFT TESTISCULAR BIOPSY Tissue TESTIS LEFT SURGICAL PATHOLOGY EXAM  Gian Rivera MD 12/19/2024 0854   2 : RIGHT TESTICULAR BIOPSY Tissue TESTIS RIGHT SURGICAL PATHOLOGY EXAM Gian Rivera MD 12/19/2024 0855         Drains and/or Catheters: * None in log *    Findings: No evidence of sperm found on bilateral testicular specimens multiple areas sampled including upper, mid and lower portions of the testicles bilaterally.  Testicular specimens were also sent for pathological evaluation; 1 sample for each testicle.    Indications: Mateo Leong is an 39 y.o. male who is having surgery for non- obstructive Azoospermia [N46.01].     The patient was seen in the preoperative area. The risks, benefits, complications, treatment options, non-operative alternatives, expected recovery and outcomes were discussed with the patient. The possibilities of reaction to medication, pulmonary aspiration, injury to surrounding structures, bleeding, recurrent infection, the need for additional procedures, failure to diagnose a condition, and creating a complication requiring transfusion or operation were discussed with the patient. The patient concurred with the proposed plan, giving informed consent.  The site of surgery was properly noted/marked if necessary per policy. The patient has been actively warmed in preoperative area. Preoperative antibiotics have been ordered and given within 1 hours of incision. Venous thrombosis prophylaxis have been ordered including bilateral sequential compression devices    Procedure Details: Procedure documentation:  1. scrotal exploration (38676)  2. micro- epididymal sperm aspiration ()  3. exploration of epididymis, with or without biopsy (61816)  4. use of operating microscope  (77379)  5. testis biopsy, incisional (35285)    After proper prepping and draping a cord block was performed on the left spermatic cord using 10cc Marcaine.  A transverse incision was made over the left hemiscrotum. We dissected down to the testicle and delivered it.   A transverse incision ~2cm as made on the left testicle's tunica albuginea. The operative microscope was brought onto the field. Under the microscope, Seminiferous tubules were extracted through the incision using forceps and curved iris scissors. A sample of tubules was placed in a working dish and teased with a curved iris scissor then further processed with an 18 gauge needle, and 22 gauge needle. multiple drops were placed on a slide and examined under the microscope.  This step was repeated for multiple areas of the left testicle including upper, mid and inferior portions of the testicle.  No evidence of sperm was found and therefore it was determined to explore the right testicle.  The aforementioned steps including delivering of the testicle, incision into the tunica albuginea, and samples being taken from upper, mid and lower right testicle was performed.  Again, we found no evidence of viable sperm.  We then passed off the samples of both testicles for reproductive endocrinology to analyze.  We then took 2 additional samples, 1 from each testicle, for pathologic specimens.  We then achieved hemostasis with bipolar electrocautery.    We then closed the testicular incision with 4-0 PDS bilaterally. A spermatic cord block was performed with 10cc of marcaine on the right spermatic cord. We checked the scrotum and dartos and achieved meticulous hemostasis with electrocautery bilaterally. The tunica vaginalis was closed over the testicle with 4-0 vicryl bilaterally. We again confirmed hemostasis bilaterally. Then, we closed dartos layer with 2-0 vicryl running suture bilaterally. 10cc of marcaine were used for local anesthesia on the dartos. Then, 4-0 monocryl was used for skin bilaterally. Dermabond was used over the incision. Fluffs and jockstrap were added.  The patient was awoken from anesthesia and taken to PACU in stable condition.     Complications:  None; patient tolerated the procedure well.     Disposition: PACU - hemodynamically stable.  Condition: stable     Additional Details:   Follow up with andrology results  Follow-up on pathology results  2-week follow-up postop visit already scheduled  No sex or masturbation for 1 month    Attending Attestation: I was present and scrubbed for the entire procedure.    Gian Rivera  Phone Number: [phone]

## 2024-12-19 NOTE — ANESTHESIA PREPROCEDURE EVALUATION
Patient: Mateo Leong    Procedure Information       Date/Time: 12/19/24 0730    Procedure: Bilateral Sperm Retrieval (Bilateral)    Location: [facility] A OR 01 / Virtual [facility] A OR    Surgeons: Gian Rivera MD            Relevant Problems   Cardiac   (+) Hyperlipidemia      Pulmonary   (+) Asthma      Skin   (+) Rash       Clinical information reviewed:   Tobacco  Allergies  Meds   Med Hx  Surg Hx   Fam Hx  Soc Hx        NPO Detail:  NPO/Void Status  Carbohydrate Drink Given Prior to Surgery? : N  Date of Last Liquid: 12/19/24  Time of Last Liquid: 0430  Date of Last Solid: 12/18/24  Time of Last Solid: 2130  Last Intake Type: Clear fluids  Time of Last Void: 0617         Physical Exam    Airway  Mallampati: II  TM distance: >3 FB  Neck ROM: full     Cardiovascular   Rhythm: regular  Rate: normal     Dental    Pulmonary   Breath sounds clear to auscultation     Abdominal            Anesthesia Plan    History of general anesthesia?: yes  History of complications of general anesthesia?: no    ASA 2     general     intravenous induction   Anesthetic plan and risks discussed with patient.    Plan discussed with CRNA.

## 2024-12-20 NOTE — PROGRESS NOTES
Postop phone call    -NO sperm found, reviewed with patient  -pain controlled  -No fevers/chills/nausea/vomiting  -urinating ok

## 2024-12-22 DIAGNOSIS — N46.01 AZOOSPERMIA: Primary | ICD-10-CM

## 2024-12-22 RX ORDER — KETOROLAC TROMETHAMINE 10 MG/1
10 TABLET, FILM COATED ORAL EVERY 6 HOURS PRN
Qty: 20 TABLET | Refills: 0 | Status: SHIPPED | OUTPATIENT
Start: 2024-12-22 | End: 2024-12-27

## 2024-12-22 NOTE — PROGRESS NOTES
Received a telehealth page regarding pain and belly cramping after surgery on Thursday.  Patient notes that pain was initially well-controlled however woke up today with pain in testicle that is somewhat controlled with current pain regiment.  Patient notes that he is running low on opioid medication.  Patient noted having no fever, chills, nausea, vomiting, bloating, constipation.  Patient notes is passing gas and is otherwise having normal activity.  Patient notes that pain is in testicles and that bruising has occurred around incision however it is unchanged since postop day 1.  Patient notes no redness, swelling or irritation to the incision area.    Spoke with patient at length about needing to continue with bowel regimen that was prescribed for him on discharge.  Also noted will send Toradol for pain relief as we would like to get away from opioids given bowel symptoms.  Patient was instructed also to take Tylenol around-the-clock.  Patient agreeable with plan and will follow-up with Dr. Rivera's office tomorrow if pain continues.    Anna Gomes MD   Urology [place]  Adult Urology Pager: [phone]  Pediatric Urology Pager: [phone]

## 2024-12-30 LAB
LAB AP ASR DISCLAIMER: NORMAL
LABORATORY COMMENT REPORT: NORMAL
PATH REPORT.FINAL DX SPEC: NORMAL
PATH REPORT.GROSS SPEC: NORMAL
PATH REPORT.RELEVANT HX SPEC: NORMAL
PATH REPORT.TOTAL CANCER: NORMAL

## 2025-01-02 ENCOUNTER — APPOINTMENT (OUTPATIENT)
Dept: ENDOCRINOLOGY | Facility: CLINIC | Age: 40
End: 2025-01-02
Payer: COMMERCIAL

## 2025-01-02 LAB — COMMENTS - MP RESULT TYPE: NORMAL

## 2025-01-07 ENCOUNTER — APPOINTMENT (OUTPATIENT)
Dept: UROLOGY | Facility: CLINIC | Age: 40
End: 2025-01-07
Payer: COMMERCIAL

## 2025-01-07 VITALS — TEMPERATURE: 96.8 F

## 2025-01-07 DIAGNOSIS — Z31.69 INFERTILITY COUNSELING: Primary | ICD-10-CM

## 2025-01-07 DIAGNOSIS — N46.01 AZOOSPERMIA: ICD-10-CM

## 2025-01-07 DIAGNOSIS — R79.89 LOW TESTOSTERONE: ICD-10-CM

## 2025-01-07 PROCEDURE — 1036F TOBACCO NON-USER: CPT | Performed by: UROLOGY

## 2025-01-07 PROCEDURE — 99024 POSTOP FOLLOW-UP VISIT: CPT | Performed by: UROLOGY

## 2025-01-07 ASSESSMENT — PAIN SCALES - GENERAL: PAINLEVEL_OUTOF10: 0-NO PAIN

## 2025-01-07 NOTE — PROGRESS NOTES
HPI:  39 y.o. yo male  referred to me by  Dr Ortega for infertility    Last Visit, 10/8/24:   - will schedule micro-TESE , will call us to schedule (insurance coverage)  -- will hold off on low T treatment at this time because of fertility preservation     Today's Visit     #infertility  - s/p bilateral micro-TESE 24  -no sperm was found  -denies f/c/n/v  -denies pus from wound    Surgical pathology, 24:  FINAL DIAGNOSIS   A. Testis, left, biopsy:  -- Seminiferous tubules containing only Sertoli cells (see note)     B. Testis, right, biopsy:  -- Seminiferous tubules containing only Sertoli cells (see note)     Note: Histologic sections from both testes demonstrate seminiferous tubules containing only Sertoli cels. An immunohistochemical stain for SALL4 (germ cell marker) performed on both parts fails to highlight any germ cells. While this may represent pure germ cell aplasia, these findings can also be patchy and the result of limited sampling. Clinical correlation is warranted.         Partner/wife name: Nohemy Leong  Partner/wife  : 1986  Attempting Pregnancy for :   11 months  Previous pregnancies for either partner: none    Wife is optimized from gyn perspective     Previous Semen analysis / Labs:   Component      Latest Ref Rng 2024   Volume (Semen)      1.5 mL 5.10  5.00    Concentration(Semen)      15 mill/mL 0.00 !  0 !    Total Motility (Semen)      40 % 0 !  0 !    Prog. Motility (Semen)      32 % 0 !  0 !    Non Prog. Motility (Semen)      % 0  0    Total No of Sperm (Semen)      39 mill 0.00 !  0 !    Total No of Motile (Semen)      mill 0.00  0    Total No of Rnd Cells (Semen)      5 mill 0.5  0.70    Leukocyte (Semen) NOT PERFORMED  NOT PERFORMED    VOLUME CN (Post-Wash)      mL 0.20  0.20    CONCENTRATION CN (Post-Wash)      mill/mL 0.00  0    TOTAL MOTILITY CN (Post-Wash)      % 0  0    PROG. MOTILITY CN (Post-Wash)      % 0  0    NON PROG. MOTILITY CN  (Post-Wash)      % 0  0    TOTAL NO OF SPERM CN (Post-Wash)      mill 0.00  0    TOTAL NO OF MOTILE CN (Post-wash)      mill 0.00  0       Labs, all reviewed and interpreted:   Y Deletion 9/18/24: negative  Chromosome analysis pending   Lab Results   Component Value Date    TESTOSTERONE 242 09/18/2024     Lab Results   Component Value Date    LH 8.5 09/18/2024     Lab Results   Component Value Date    FSH 25.7 09/18/2024       PREVIOUS RISK FACTORS  History of testicular exposure to chemicals, radiation, or toxins: None  History of high fever, epididymitis, orchitis, prostatitis, sexually transmitted diseases, or trauma to the testicles: None  History of a varicocele, testicular torsion, cryptorchidism, postpubertile mumps or a family history of infertility: None  Coital Frequency: not recently because of SA, fertile window trying 5/7 days   Coital Lubricants: None  Problems with erections or ejaculation: None  Smoker?  Chews tobacco   Previous Testosterone or anabolic steroid Use: none      PRIOR Assisted Reproductive Technology:  None      PMH:  Past Medical History:   Diagnosis Date    Other acne 12/02/2004    Other hemorrhoids     Internal hemorrhoids    Personal history of other diseases of the digestive system     History of bloody stools    Right-sided low back pain without sciatica 01/18/2016        PSH:  Past Surgical History:   Procedure Laterality Date    OTHER SURGICAL HISTORY  06/21/2022    Colonoscopy    WISDOM TOOTH EXTRACTION          Medications:    Current Outpatient Medications:     NON FORMULARY, Take 1 each by mouth once daily. Colon broom, Disp: , Rfl:     prenatal vitamin calcium-iron-folic 27 mg iron- 1 mg tablet, Take 1 tablet by mouth once daily., Disp: , Rfl:     Allergy:  Allergies   Allergen Reactions    Amoxicillin Rash        Exam  Testicles descended bilaterally, nontender, no masses  Vasa palpable bilaterally  Penis circ'd, no lesions, no plaques  Bilateral scrotal incisions healing  appropriately       Assessment/Plan  #infertility  -s/p micro-TESE with no sperm found ; path sertoli cell only  -discussed IUI and IVF with donor sperm vs adoption      #low testosterone  -discussed r/b/a of treatment. Would re-test T if he becomes symptomatic and consider treatment    Fu with me prn        G [id number]  Visit complexity inherent to evaluation and management (E&M) associated with medical care services that serve as the continuing focal point for all needed health care services and/or with medical care services that are part of ongoing care related to a patient's single, serious condition or a complex condition.    Scribe Attestation  By signing my name below, IChaya Scribe   attest that this documentation has been prepared under the direction and in the presence of Gian Rivera MD.

## 2025-01-07 NOTE — LETTER
2025     Dionne Delgado MD   [facility] [place]  [facility] [address]  [facility] [address]    Patient: Mateo Leong   YOB: 1985   Date of Visit: 2025       Dear Dr. Dionne Delgado MD:    Thank you for referring Mateo Leong to me for evaluation. Below are my notes for this consultation.  If you have questions, please do not hesitate to call me. I look forward to following your patient along with you.       Sincerely,     Gian Rivera MD      CC: No Recipients  ______________________________________________________________________________________      HPI:  39 y.o. yo male  referred to me by  Dr Ortega for infertility    Last Visit, 10/8/24:   - will schedule micro-TESE , will call us to schedule (insurance coverage)  -- will hold off on low T treatment at this time because of fertility preservation     Today's Visit     #infertility  - s/p bilateral micro-TESE 24  -no sperm was found  -denies f/c/n/v  -denies pus from wound    Surgical pathology, 24:  FINAL DIAGNOSIS   A. Testis, left, biopsy:  -- Seminiferous tubules containing only Sertoli cells (see note)     B. Testis, right, biopsy:  -- Seminiferous tubules containing only Sertoli cells (see note)     Note: Histologic sections from both testes demonstrate seminiferous tubules containing only Sertoli cels. An immunohistochemical stain for SALL4 (germ cell marker) performed on both parts fails to highlight any germ cells. While this may represent pure germ cell aplasia, these findings can also be patchy and the result of limited sampling. Clinical correlation is warranted.         Partner/wife name: Nohemy Leong  Partner/wife  : 1985  Attempting Pregnancy for :   11 months  Previous pregnancies for either partner: none    Wife is optimized from gyn perspective     Previous Semen analysis / Labs:   Component      Latest Ref Rng 2024   Volume  (Semen)      1.5 mL 5.10  5.00    Concentration(Semen)      15 mill/mL 0.00 !  0 !    Total Motility (Semen)      40 % 0 !  0 !    Prog. Motility (Semen)      32 % 0 !  0 !    Non Prog. Motility (Semen)      % 0  0    Total No of Sperm (Semen)      39 mill 0.00 !  0 !    Total No of Motile (Semen)      mill 0.00  0    Total No of Rnd Cells (Semen)      5 mill 0.5  0.70    Leukocyte (Semen) NOT PERFORMED  NOT PERFORMED    VOLUME CN (Post-Wash)      mL 0.20  0.20    CONCENTRATION CN (Post-Wash)      mill/mL 0.00  0    TOTAL MOTILITY CN (Post-Wash)      % 0  0    PROG. MOTILITY CN (Post-Wash)      % 0  0    NON PROG. MOTILITY CN (Post-Wash)      % 0  0    TOTAL NO OF SPERM CN (Post-Wash)      mill 0.00  0    TOTAL NO OF MOTILE CN (Post-wash)      mill 0.00  0       Labs, all reviewed and interpreted:   Y Deletion 9/18/24: negative  Chromosome analysis pending   Lab Results   Component Value Date    TESTOSTERONE 242 09/18/2024     Lab Results   Component Value Date    LH 8.5 09/18/2024     Lab Results   Component Value Date    FSH 25.7 09/18/2024       PREVIOUS RISK FACTORS  History of testicular exposure to chemicals, radiation, or toxins: None  History of high fever, epididymitis, orchitis, prostatitis, sexually transmitted diseases, or trauma to the testicles: None  History of a varicocele, testicular torsion, cryptorchidism, postpubertile mumps or a family history of infertility: None  Coital Frequency: not recently because of SA, fertile window trying 5/7 days   Coital Lubricants: None  Problems with erections or ejaculation: None  Smoker?  Chews tobacco   Previous Testosterone or anabolic steroid Use: none      PRIOR Assisted Reproductive Technology:  None      PMH:  Past Medical History:   Diagnosis Date   • Other acne 12/02/2004   • Other hemorrhoids     Internal hemorrhoids   • Personal history of other diseases of the digestive system     History of bloody stools   • Right-sided low back pain without sciatica  01/18/2016        PSH:  Past Surgical History:   Procedure Laterality Date   • OTHER SURGICAL HISTORY  06/21/2022    Colonoscopy   • WISDOM TOOTH EXTRACTION          Medications:    Current Outpatient Medications:   •  NON FORMULARY, Take 1 each by mouth once daily. Colon broom, Disp: , Rfl:   •  prenatal vitamin calcium-iron-folic 27 mg iron- 1 mg tablet, Take 1 tablet by mouth once daily., Disp: , Rfl:     Allergy:  Allergies   Allergen Reactions   • Amoxicillin Rash        Exam  Testicles descended bilaterally, nontender, no masses  Vasa palpable bilaterally  Penis circ'd, no lesions, no plaques  Bilateral scrotal incisions healing appropriately       Assessment/Plan  #infertility  -s/p micro-TESE with no sperm found ; path sertoli cell only  -discussed IUI and IVF with donor sperm vs adoption      #low testosterone  -discussed r/b/a of treatment. Would re-test T if he becomes symptomatic and consider treatment    Fu with me prn        G [id number]  Visit complexity inherent to evaluation and management (E&M) associated with medical care services that serve as the continuing focal point for all needed health care services and/or with medical care services that are part of ongoing care related to a patient's single, serious condition or a complex condition.    Scribe Attestation  By signing my name below, IChaya Scribe   attest that this documentation has been prepared under the direction and in the presence of Gian Rivera MD.

## 2025-01-09 ENCOUNTER — APPOINTMENT (OUTPATIENT)
Dept: ENDOCRINOLOGY | Facility: CLINIC | Age: 40
End: 2025-01-09
Payer: COMMERCIAL

## 2025-01-22 ENCOUNTER — APPOINTMENT (OUTPATIENT)
Dept: UROLOGY | Facility: CLINIC | Age: 40
End: 2025-01-22
Payer: COMMERCIAL

## 2025-05-30 PROBLEM — K64.8 INTERNAL HEMORRHOIDS: Status: ACTIVE | Noted: 2025-05-30

## 2025-06-04 ENCOUNTER — APPOINTMENT (OUTPATIENT)
Dept: PRIMARY CARE | Facility: CLINIC | Age: 40
End: 2025-06-04
Payer: COMMERCIAL

## 2025-06-04 VITALS
BODY MASS INDEX: 32.52 KG/M2 | SYSTOLIC BLOOD PRESSURE: 115 MMHG | DIASTOLIC BLOOD PRESSURE: 79 MMHG | HEART RATE: 78 BPM | HEIGHT: 73 IN | TEMPERATURE: 98 F | WEIGHT: 245.38 LBS | OXYGEN SATURATION: 97 %

## 2025-06-04 DIAGNOSIS — R73.01 IFG (IMPAIRED FASTING GLUCOSE): ICD-10-CM

## 2025-06-04 DIAGNOSIS — Z00.00 WELL ADULT HEALTH CHECK: Primary | ICD-10-CM

## 2025-06-04 DIAGNOSIS — J45.990 ASTHMA, EXERCISE INDUCED: ICD-10-CM

## 2025-06-04 PROCEDURE — 3008F BODY MASS INDEX DOCD: CPT | Performed by: FAMILY MEDICINE

## 2025-06-04 PROCEDURE — 1036F TOBACCO NON-USER: CPT | Performed by: FAMILY MEDICINE

## 2025-06-04 PROCEDURE — 90677 PCV20 VACCINE IM: CPT | Performed by: FAMILY MEDICINE

## 2025-06-04 PROCEDURE — 90471 IMMUNIZATION ADMIN: CPT | Performed by: FAMILY MEDICINE

## 2025-06-04 PROCEDURE — 99213 OFFICE O/P EST LOW 20 MIN: CPT | Performed by: FAMILY MEDICINE

## 2025-06-04 PROCEDURE — 99395 PREV VISIT EST AGE 18-39: CPT | Performed by: FAMILY MEDICINE

## 2025-06-04 ASSESSMENT — PROMIS GLOBAL HEALTH SCALE
EMOTIONAL_PROBLEMS: RARELY
RATE_GENERAL_HEALTH: VERY GOOD
RATE_AVERAGE_PAIN: 1
CARRYOUT_PHYSICAL_ACTIVITIES: COMPLETELY
RATE_SOCIAL_SATISFACTION: VERY GOOD
CARRYOUT_SOCIAL_ACTIVITIES: VERY GOOD
RATE_MENTAL_HEALTH: VERY GOOD
RATE_PHYSICAL_HEALTH: VERY GOOD
RATE_AVERAGE_FATIGUE: MILD
RATE_QUALITY_OF_LIFE: VERY GOOD

## 2025-06-04 ASSESSMENT — ENCOUNTER SYMPTOMS
LOSS OF SENSATION IN FEET: 0
PALPITATIONS: 0
CHOKING: 0
FEVER: 0
CHEST TIGHTNESS: 0
DEPRESSION: 0
WHEEZING: 0
LIGHT-HEADEDNESS: 0
SHORTNESS OF BREATH: 0
DIZZINESS: 0
CHILLS: 0
HEADACHES: 0
DIAPHORESIS: 0
OCCASIONAL FEELINGS OF UNSTEADINESS: 0
APNEA: 0
COUGH: 0
UNEXPECTED WEIGHT CHANGE: 0

## 2025-06-04 ASSESSMENT — PATIENT HEALTH QUESTIONNAIRE - PHQ9
2. FEELING DOWN, DEPRESSED OR HOPELESS: NOT AT ALL
2. FEELING DOWN, DEPRESSED OR HOPELESS: NOT AT ALL
SUM OF ALL RESPONSES TO PHQ9 QUESTIONS 1 AND 2: 0
1. LITTLE INTEREST OR PLEASURE IN DOING THINGS: NOT AT ALL
SUM OF ALL RESPONSES TO PHQ9 QUESTIONS 1 AND 2: 0
1. LITTLE INTEREST OR PLEASURE IN DOING THINGS: NOT AT ALL

## 2025-06-04 NOTE — PROGRESS NOTES
"Subjective   Patient ID: Mateo Leong is a 39 y.o. male who presents for Annual Exam (Pt presents for annual Wellness, no concerns, no refills needed.).  HPI Historian(s): Self    Generally feeling well.    Review of Systems   Constitutional:  Negative for chills, diaphoresis, fever and unexpected weight change.   Eyes:  Negative for visual disturbance.   Respiratory:  Negative for apnea (no PND), cough, choking, chest tightness, shortness of breath and wheezing.    Cardiovascular:  Negative for chest pain, palpitations and leg swelling.   Neurological:  Negative for dizziness, syncope, light-headedness and headaches.   All other systems reviewed and are negative.    No LMP for male patient.    Tobacco Use History[1]  Social History     Substance and Sexual Activity   Alcohol Use Yes    Alcohol/week: 10.0 standard drinks of alcohol    Types: 3 Glasses of wine, 6 Cans of beer, 1 Standard drinks or equivalent per week    Comment: Few drinks on the weekend.     Social History     Substance and Sexual Activity   Drug Use Never       Family History[2]    Patient Care Team:  Roberto Johnson DO as PCP - General (Family Medicine)  Roberto Johnson DO as PCP - [facility] PCP    RX Allergies[3]    Prior to Admission medications    Medication Sig Start Date End Date Taking? Authorizing Provider   NON FORMULARY Take 1 each by mouth once daily. Colon broom    Historical Provider, MD   prenatal vitamin calcium-iron-folic 27 mg iron- 1 mg tablet Take 1 tablet by mouth once daily.    Historical Provider, MD        Objective   /79   Pulse 78   Temp 36.7 °C (98 °F)   Ht 1.861 m (6' 1.25\")   Wt 111 kg (245 lb 6 oz)   SpO2 97%   BMI 32.15 kg/m²           Physical Exam  Vitals and nursing note reviewed.   Constitutional:       General: He is not in acute distress.     Appearance: Normal appearance. He is well-developed.      Comments: No assistive device presently being used.   HENT:      Head: Normocephalic and " atraumatic.      Nose: Nose normal.   Eyes:      General: No scleral icterus.     Extraocular Movements: Extraocular movements intact.      Conjunctiva/sclera: Conjunctivae normal.   Neck:      Thyroid: No thyromegaly.      Vascular: No carotid bruit or JVD.   Cardiovascular:      Rate and Rhythm: Normal rate and regular rhythm.      Heart sounds: Normal heart sounds.   Pulmonary:      Effort: Pulmonary effort is normal. No respiratory distress.      Breath sounds: Normal breath sounds.   Abdominal:      General: Bowel sounds are normal. There is no distension.      Palpations: Abdomen is soft. There is no mass.      Tenderness: There is no abdominal tenderness. There is no guarding or rebound.   Musculoskeletal:      Cervical back: Normal range of motion. No tenderness.      Right lower leg: No edema.      Left lower leg: No edema.   Skin:     General: Skin is warm and dry.      Coloration: Skin is not jaundiced.   Neurological:      General: No focal deficit present.      Mental Status: He is alert and oriented to person, place, and time. Mental status is at baseline.   Psychiatric:         Mood and Affect: Mood normal.         Behavior: Behavior normal.         Thought Content: Thought content normal.         Assessment & Plan  Well adult health check  39yM doing fairly well.       IFG (impaired fasting glucose)  Therapeutic lifestyle changes.  Orders:    Comprehensive Metabolic Panel; Future    Hemoglobin A1C; Future    Asthma, exercise induced  Stable. Prevnar-20.                         [1]   Social History  Tobacco Use   Smoking Status Never    Passive exposure: Never   Smokeless Tobacco Former    Types: Chew    Quit date: 11/1/2024   [2]   Family History  Problem Relation Name Age of Onset    Hypothyroidism Mother      Crohn's disease Father      Heart attack Paternal Grandfather  70 - 79    Cancer Paternal Grandfather     [3]   Allergies  Allergen Reactions    Amoxicillin Rash

## 2025-06-04 NOTE — PATIENT INSTRUCTIONS
"  Please return for your next Wellness visit in 12 months. Please schedule additional problem-focused appointment(s) to address additional problem(s). Simply mentioning or talking briefly about a problem or concern does not necessarily mean it is currently being addressed. Time constraints dictate that not every problem/concern can always be addressed.    Recommended vaccines:  Influenza, annual  Prevnar-20 \"pneumonia\" vaccine  Avoid taking Biotin (a vitamin, shows up particularly in hair/nail supplements) for a week prior to any blood tests, as it can interfere with certain results. Fasting for labs means 12 hours, nothing to eat or drink, except water and medications, unless directed otherwise.    For assistance with scheduling referrals or consultations, please call [phone]. For laboratory, radiology, and other tests, please call [phone] ([phone] for pediatrics). Please review prescription inserts and published information for possible adverse effects of all medications. Return after testing or consultation to review results and recommendations, if symptoms persist, change, worsen, or return, or if you have any question or concern. If you do not get results within 7-10 days, or you have any question or concern, please send a message, call the office ([phone]), or return to the office for a follow-up appointment. For non-emergencies, you may call the office. For emergencies, call 9-1-1 or go to the nearest Emergency Department. Please schedule additional appointment(s) to address concern(s) not addressed today. An annual Wellness visit is strongly recommended. A Wellness visit should be dedicated to addressing routine health maintenance matters (e.g., cancer screenings, cardiovascular screening, etc.). Problem-focused visits, typically prompted by symptoms or specific concerns, are usually conducted separately, particularly if multiple or complex problems need to be addressed.    In general, " results are not released or discussed over the telephone, but at an appointment or via  [employer]. Results of tests done through [facility] are released via  [employer] (see below).  [email or web address]   [employer] support line: [phone]

## 2025-06-04 NOTE — ASSESSMENT & PLAN NOTE
Therapeutic lifestyle changes.  Orders:    Comprehensive Metabolic Panel; Future    Hemoglobin A1C; Future

## 2025-06-05 LAB
ALBUMIN SERPL-MCNC: 4.7 G/DL (ref 3.6–5.1)
ALP SERPL-CCNC: 49 U/L (ref 36–130)
ALT SERPL-CCNC: 21 U/L (ref 9–46)
ANION GAP SERPL CALCULATED.4IONS-SCNC: 9 MMOL/L (CALC) (ref 7–17)
AST SERPL-CCNC: 21 U/L (ref 10–40)
BILIRUB SERPL-MCNC: 1.4 MG/DL (ref 0.2–1.2)
BUN SERPL-MCNC: 14 MG/DL (ref 7–25)
CALCIUM SERPL-MCNC: 9.6 MG/DL (ref 8.6–10.3)
CHLORIDE SERPL-SCNC: 104 MMOL/L (ref 98–110)
CO2 SERPL-SCNC: 25 MMOL/L (ref 20–32)
CREAT SERPL-MCNC: 0.79 MG/DL (ref 0.6–1.26)
EGFRCR SERPLBLD CKD-EPI 2021: 117 ML/MIN/1.73M2
EST. AVERAGE GLUCOSE BLD GHB EST-MCNC: 105 MG/DL
EST. AVERAGE GLUCOSE BLD GHB EST-SCNC: 5.8 MMOL/L
GLUCOSE SERPL-MCNC: 95 MG/DL (ref 65–99)
HBA1C MFR BLD: 5.3 %
POTASSIUM SERPL-SCNC: 4.2 MMOL/L (ref 3.5–5.3)
PROT SERPL-MCNC: 7.4 G/DL (ref 6.1–8.1)
SODIUM SERPL-SCNC: 138 MMOL/L (ref 135–146)

## (undated) DEVICE — DRESSING, GAUZE, FLUFF, 1 PLY, 18 X 36 IN

## (undated) DEVICE — GLOVE, SURGICAL, PROTEXIS MICRO, 7.0, PF, LATEX

## (undated) DEVICE — SYRINGE, MONOJECT, LUER LOCK, 3 CC, LF

## (undated) DEVICE — ELECTRODE, ELECTROSURGICAL, BLADE, STANDARD, 2.75 IN

## (undated) DEVICE — GOWN, ASTOUND, L

## (undated) DEVICE — NEEDLE, HYPODERMIC, MONOJECT, 18 G X 1.5 IN

## (undated) DEVICE — DRAPE, SHEET, UTILITY, NON ABSORBENT, 18 X 26 IN, LF

## (undated) DEVICE — DRAPE, SHEET, ENDOSCOPY, GENERAL, FENESTRATED, ARMBOARD COVER, 98 X 123.5 IN, DISPOSABLE, LF, STERILE

## (undated) DEVICE — CATHETER, IV, INSYTE, AUTOGUARD, SHIELDED, 24 G X 0.75 IN, VIALON

## (undated) DEVICE — SUTURE, VICRYL PLUS, 2-0, 27IN, UR-6, VIOLET, BRAIDED

## (undated) DEVICE — SPONGE, LAP, XRAY DECT, 18IN X 18IN, W/MASTER DMT, STERILE

## (undated) DEVICE — DRAPE, MICROSCOPE, FOR ZEISS 65MM, VARI-LENS II, 52 X 150

## (undated) DEVICE — SUTURE, PDS II, 4-0, 18 IN, PS2, CLEAR

## (undated) DEVICE — CONTAINER, SPECIMEN, 120 ML, STERILE

## (undated) DEVICE — DRAIN, PENROSE, 1/4 IN X 18 IN, STERILE, LF

## (undated) DEVICE — SUTURE, MONOCRYL, 4-0, 18 IN, PS2, UNDYED

## (undated) DEVICE — Device

## (undated) DEVICE — SLEEVE, SCD EXPRESS, KNEE LENGTH-MEDIUM

## (undated) DEVICE — SUTURE, VICRYL, 4-0, 70CM, TAPER SH

## (undated) DEVICE — SYRINGE, 1 CC, LUER LOCK

## (undated) DEVICE — SLIDES, FROSTED ENDS, 1X3, STERILE

## (undated) DEVICE — SYRINGE, HYPODERMIC, LUER LOCK, 6 CC

## (undated) DEVICE — SUPPORTER, ATHLETIC,  ADULT, MEDIUM

## (undated) DEVICE — CORD, CAUTERY, BIOPOLAR FORCEP, 12FT

## (undated) DEVICE — SUPPORTER, ATHLETIC, ADULT, LARGE

## (undated) DEVICE — NEEDLE, HYPODERMIC, 23 GA X 1.5 IN

## (undated) DEVICE — ADHESIVE, SKIN, DERMABOND ADVANCED, 15CM, PEN-STYLE

## (undated) DEVICE — PAD, GROUNDING, ELECTROSURGICAL, W/9 FT CABLE, POLYHESIVE II, ADULT, LF